# Patient Record
Sex: FEMALE | NOT HISPANIC OR LATINO
[De-identification: names, ages, dates, MRNs, and addresses within clinical notes are randomized per-mention and may not be internally consistent; named-entity substitution may affect disease eponyms.]

---

## 2017-01-13 ENCOUNTER — APPOINTMENT (OUTPATIENT)
Dept: OPHTHALMOLOGY | Facility: CLINIC | Age: 72
End: 2017-01-13

## 2017-01-13 DIAGNOSIS — H52.4 PRESBYOPIA: ICD-10-CM

## 2017-03-31 ENCOUNTER — APPOINTMENT (OUTPATIENT)
Dept: OPHTHALMOLOGY | Facility: CLINIC | Age: 72
End: 2017-03-31

## 2017-03-31 DIAGNOSIS — H26.9 UNSPECIFIED CATARACT: ICD-10-CM

## 2017-05-24 ENCOUNTER — APPOINTMENT (OUTPATIENT)
Dept: OPHTHALMOLOGY | Facility: CLINIC | Age: 72
End: 2017-05-24

## 2018-01-31 ENCOUNTER — APPOINTMENT (OUTPATIENT)
Dept: OPHTHALMOLOGY | Facility: CLINIC | Age: 73
End: 2018-01-31
Payer: MEDICARE

## 2018-01-31 PROCEDURE — 92134 CPTRZ OPH DX IMG PST SGM RTA: CPT

## 2018-01-31 PROCEDURE — 92014 COMPRE OPH EXAM EST PT 1/>: CPT

## 2018-03-27 ENCOUNTER — APPOINTMENT (OUTPATIENT)
Dept: OPHTHALMOLOGY | Facility: CLINIC | Age: 73
End: 2018-03-27
Payer: MEDICARE

## 2018-03-27 DIAGNOSIS — H26.491 OTHER SECONDARY CATARACT, RIGHT EYE: ICD-10-CM

## 2018-03-27 PROCEDURE — 66821 AFTER CATARACT LASER SURGERY: CPT | Mod: RT

## 2018-04-24 ENCOUNTER — APPOINTMENT (OUTPATIENT)
Dept: OPHTHALMOLOGY | Facility: CLINIC | Age: 73
End: 2018-04-24
Payer: MEDICARE

## 2018-04-24 DIAGNOSIS — H26.9 UNSPECIFIED CATARACT: ICD-10-CM

## 2018-04-24 PROCEDURE — 92012 INTRM OPH EXAM EST PATIENT: CPT | Mod: 24

## 2018-07-27 PROBLEM — H52.4 BILATERAL PRESBYOPIA: Status: ACTIVE | Noted: 2017-01-18

## 2018-11-14 ENCOUNTER — APPOINTMENT (OUTPATIENT)
Dept: OPHTHALMOLOGY | Facility: CLINIC | Age: 73
End: 2018-11-14
Payer: MEDICARE

## 2018-11-14 PROCEDURE — 92133 CPTRZD OPH DX IMG PST SGM ON: CPT

## 2018-11-14 PROCEDURE — 92083 EXTENDED VISUAL FIELD XM: CPT

## 2018-11-14 PROCEDURE — 92014 COMPRE OPH EXAM EST PT 1/>: CPT

## 2019-03-04 ENCOUNTER — RECORD ABSTRACTING (OUTPATIENT)
Age: 74
End: 2019-03-04

## 2019-03-04 DIAGNOSIS — Z82.49 FAMILY HISTORY OF ISCHEMIC HEART DISEASE AND OTHER DISEASES OF THE CIRCULATORY SYSTEM: ICD-10-CM

## 2019-03-04 DIAGNOSIS — M72.0 PALMAR FASCIAL FIBROMATOSIS [DUPUYTREN]: ICD-10-CM

## 2019-03-04 DIAGNOSIS — Z80.3 FAMILY HISTORY OF MALIGNANT NEOPLASM OF BREAST: ICD-10-CM

## 2019-03-04 DIAGNOSIS — R31.29 OTHER MICROSCOPIC HEMATURIA: ICD-10-CM

## 2019-03-04 RX ORDER — CALCIUM CARBONATE 500(1250)
500 TABLET ORAL
Refills: 0 | Status: ACTIVE | COMMUNITY

## 2019-03-04 RX ORDER — UBIDECARENONE/VIT E ACET 100MG-5
50 MCG CAPSULE ORAL
Refills: 0 | Status: ACTIVE | COMMUNITY

## 2019-03-07 ENCOUNTER — APPOINTMENT (OUTPATIENT)
Dept: FAMILY MEDICINE | Facility: CLINIC | Age: 74
End: 2019-03-07
Payer: MEDICARE

## 2019-03-07 ENCOUNTER — NON-APPOINTMENT (OUTPATIENT)
Age: 74
End: 2019-03-07

## 2019-03-07 VITALS
BODY MASS INDEX: 23.73 KG/M2 | HEIGHT: 64 IN | OXYGEN SATURATION: 97 % | RESPIRATION RATE: 18 BRPM | WEIGHT: 139 LBS | HEART RATE: 70 BPM | SYSTOLIC BLOOD PRESSURE: 130 MMHG | DIASTOLIC BLOOD PRESSURE: 80 MMHG | TEMPERATURE: 97.7 F

## 2019-03-07 DIAGNOSIS — R06.09 OTHER FORMS OF DYSPNEA: ICD-10-CM

## 2019-03-07 DIAGNOSIS — L70.9 ACNE, UNSPECIFIED: ICD-10-CM

## 2019-03-07 DIAGNOSIS — H90.3 SENSORINEURAL HEARING LOSS, BILATERAL: ICD-10-CM

## 2019-03-07 DIAGNOSIS — H54.7 UNSPECIFIED VISUAL LOSS: ICD-10-CM

## 2019-03-07 PROCEDURE — 99215 OFFICE O/P EST HI 40 MIN: CPT | Mod: 25

## 2019-03-07 PROCEDURE — 93000 ELECTROCARDIOGRAM COMPLETE: CPT

## 2019-03-07 PROCEDURE — 36415 COLL VENOUS BLD VENIPUNCTURE: CPT

## 2019-03-07 PROCEDURE — 94010 BREATHING CAPACITY TEST: CPT

## 2019-03-07 PROCEDURE — 82270 OCCULT BLOOD FECES: CPT

## 2019-03-07 PROCEDURE — 99173 VISUAL ACUITY SCREEN: CPT | Mod: 59,GY

## 2019-03-07 PROCEDURE — 92552 PURE TONE AUDIOMETRY AIR: CPT

## 2019-03-09 LAB
25(OH)D3 SERPL-MCNC: 77.9 NG/ML
ALBUMIN SERPL ELPH-MCNC: 4.4 G/DL
ALP BLD-CCNC: 101 U/L
ALT SERPL-CCNC: 23 U/L
ANION GAP SERPL CALC-SCNC: 16 MMOL/L
APPEARANCE: CLEAR
AST SERPL-CCNC: 22 U/L
BACTERIA: NEGATIVE
BASOPHILS # BLD AUTO: 0.09 K/UL
BASOPHILS NFR BLD AUTO: 1.7 %
BILIRUB SERPL-MCNC: 0.3 MG/DL
BILIRUBIN URINE: NEGATIVE
BLOOD URINE: ABNORMAL
BUN SERPL-MCNC: 21 MG/DL
CALCIUM SERPL-MCNC: 9.6 MG/DL
CHLORIDE SERPL-SCNC: 106 MMOL/L
CHOLEST SERPL-MCNC: 220 MG/DL
CHOLEST/HDLC SERPL: 3.5 RATIO
CO2 SERPL-SCNC: 23 MMOL/L
COLOR: YELLOW
CREAT SERPL-MCNC: 0.88 MG/DL
EOSINOPHIL # BLD AUTO: 0.18 K/UL
EOSINOPHIL NFR BLD AUTO: 3.4 %
FOLATE SERPL-MCNC: >20 NG/ML
GLUCOSE QUALITATIVE U: NEGATIVE
GLUCOSE SERPL-MCNC: 89 MG/DL
HBA1C MFR BLD HPLC: 5.9 %
HCT VFR BLD CALC: 43.8 %
HDLC SERPL-MCNC: 63 MG/DL
HGB BLD-MCNC: 13.9 G/DL
HYALINE CASTS: 3 /LPF
IMM GRANULOCYTES NFR BLD AUTO: 0.4 %
KETONES URINE: NEGATIVE
LDLC SERPL CALC-MCNC: 141 MG/DL
LEUKOCYTE ESTERASE URINE: ABNORMAL
LYMPHOCYTES # BLD AUTO: 1.48 K/UL
LYMPHOCYTES NFR BLD AUTO: 28.1 %
MAN DIFF?: NORMAL
MCHC RBC-ENTMCNC: 29 PG
MCHC RBC-ENTMCNC: 31.7 GM/DL
MCV RBC AUTO: 91.3 FL
MICROSCOPIC-UA: NORMAL
MONOCYTES # BLD AUTO: 0.48 K/UL
MONOCYTES NFR BLD AUTO: 9.1 %
NEUTROPHILS # BLD AUTO: 3.01 K/UL
NEUTROPHILS NFR BLD AUTO: 57.3 %
NITRITE URINE: NEGATIVE
PH URINE: 5
PLATELET # BLD AUTO: 180 K/UL
POTASSIUM SERPL-SCNC: 5 MMOL/L
PROT SERPL-MCNC: 6.9 G/DL
PROTEIN URINE: NEGATIVE
RBC # BLD: 4.8 M/UL
RBC # FLD: 13.9 %
RED BLOOD CELLS URINE: 4 /HPF
SODIUM SERPL-SCNC: 145 MMOL/L
SPECIFIC GRAVITY URINE: 1.02
SQUAMOUS EPITHELIAL CELLS: 4 /HPF
TRIGL SERPL-MCNC: 79 MG/DL
TSH SERPL-ACNC: 3.61 UIU/ML
UROBILINOGEN URINE: NORMAL
VIT B12 SERPL-MCNC: 478 PG/ML
WBC # FLD AUTO: 5.26 K/UL
WHITE BLOOD CELLS URINE: 3 /HPF

## 2019-03-10 ENCOUNTER — NON-APPOINTMENT (OUTPATIENT)
Age: 74
End: 2019-03-10

## 2019-03-11 PROBLEM — R06.09 DYSPNEA ON EXERTION: Status: ACTIVE | Noted: 2019-03-07

## 2019-03-11 PROBLEM — H54.7 DECREASED VISUAL ACUITY: Status: ACTIVE | Noted: 2019-03-07

## 2019-03-11 PROBLEM — H90.3 SENSORINEURAL HEARING LOSS (SNHL) OF BOTH EARS: Status: ACTIVE | Noted: 2019-03-04

## 2019-03-11 NOTE — PHYSICAL EXAM
[Normal] : Visual acuity was normal [20/___] : left eye 20/[unfilled] [No Acute Distress] : no acute distress [Well Nourished] : well nourished [Well Developed] : well developed [Well-Appearing] : well-appearing [Normal Sclera/Conjunctiva] : normal sclera/conjunctiva [PERRL] : pupils equal round and reactive to light [EOMI] : extraocular movements intact [Normal Outer Ear/Nose] : the outer ears and nose were normal in appearance [Normal Oropharynx] : the oropharynx was normal [No JVD] : no jugular venous distention [Supple] : supple [No Lymphadenopathy] : no lymphadenopathy [Thyroid Normal, No Nodules] : the thyroid was normal and there were no nodules present [No Respiratory Distress] : no respiratory distress  [Clear to Auscultation] : lungs were clear to auscultation bilaterally [No Accessory Muscle Use] : no accessory muscle use [Normal Rate] : normal rate  [Regular Rhythm] : with a regular rhythm [Normal S1, S2] : normal S1 and S2 [No Murmur] : no murmur heard [No Carotid Bruits] : no carotid bruits [No Abdominal Bruit] : a ~M bruit was not heard ~T in the abdomen [No Varicosities] : no varicosities [Pedal Pulses Present] : the pedal pulses are present [No Edema] : there was no peripheral edema [No Extremity Clubbing/Cyanosis] : no extremity clubbing/cyanosis [No Palpable Aorta] : no palpable aorta [Soft] : abdomen soft [Non Tender] : non-tender [Non-distended] : non-distended [No HSM] : no HSM [Normal Bowel Sounds] : normal bowel sounds [Normal Posterior Cervical Nodes] : no posterior cervical lymphadenopathy [Normal Anterior Cervical Nodes] : no anterior cervical lymphadenopathy [No CVA Tenderness] : no CVA  tenderness [No Spinal Tenderness] : no spinal tenderness [No Joint Swelling] : no joint swelling [Grossly Normal Strength/Tone] : grossly normal strength/tone [No Rash] : no rash [Normal Gait] : normal gait [Coordination Grossly Intact] : coordination grossly intact [No Focal Deficits] : no focal deficits [Deep Tendon Reflexes (DTR)] : deep tendon reflexes were 2+ and symmetric [Normal Affect] : the affect was normal [Normal Insight/Judgement] : insight and judgment were intact [Normal Voice/Communication] : normal voice/communication [Fundoscopic Exam Performed] : fundoscopic ~T exam ~C was performed [Normal TMs] : both tympanic membranes were normal [Normal Nasal Mucosa] : the nasal mucosa was normal [Normal Percussion] : the chest was normal to percussion [Normal Appearance] : normal in appearance [No Masses] : no palpable masses [No Nipple Discharge] : no nipple discharge [No Axillary Lymphadenopathy] : no axillary lymphadenopathy [No Stool to Guaiac] : no stool to guaiac [Normal Sphincter Tone] : normal sphincter tone [No Mass] : no mass [External Female Genitalia] : normal external genitalia [Normal Supraclavicular Nodes] : no supraclavicular lymphadenopathy [Normal Axillary Nodes] : no axillary lymphadenopathy [Normal Femoral Nodes] : no femoral lymphadenopathy [Normal Inguinal Nodes] : no inguinal lymphadenopathy [No Skin Lesions] : no skin lesions [Kyphosis] : no kyphosis [Scoliosis] : no scoliosis [Acne] : no acne [FreeTextEntry1] : Examination deferred to GYN.

## 2019-03-11 NOTE — HISTORY OF PRESENT ILLNESS
[FreeTextEntry1] : Annual Physical Exam  [de-identified] : Patient presents for Complete Physical Examination. Feels well in general. No new symptoms. Taking glaucoma prescription medications only. Nonsmoker. Social alcohol. Immunizations are up-to-date. All preventative services were reviewed in detail and appear to be current for age.

## 2019-03-16 LAB
DATE COLLECTED: NORMAL
HEMOCCULT SP1 STL QL: NEGATIVE

## 2019-03-20 ENCOUNTER — APPOINTMENT (OUTPATIENT)
Dept: OPHTHALMOLOGY | Facility: CLINIC | Age: 74
End: 2019-03-20
Payer: MEDICARE

## 2019-03-20 DIAGNOSIS — H35.373 PUCKERING OF MACULA, BILATERAL: ICD-10-CM

## 2019-03-20 DIAGNOSIS — Z96.1 PRESENCE OF INTRAOCULAR LENS: ICD-10-CM

## 2019-03-20 DIAGNOSIS — H25.12 AGE-RELATED NUCLEAR CATARACT, LEFT EYE: ICD-10-CM

## 2019-03-20 DIAGNOSIS — H43.393 OTHER VITREOUS OPACITIES, BILATERAL: ICD-10-CM

## 2019-03-20 PROCEDURE — 76514 ECHO EXAM OF EYE THICKNESS: CPT

## 2019-03-20 PROCEDURE — 92250 FUNDUS PHOTOGRAPHY W/I&R: CPT

## 2019-03-20 PROCEDURE — 92014 COMPRE OPH EXAM EST PT 1/>: CPT

## 2019-03-29 RX ORDER — LATANOPROST/PF 0.005 %
0.01 DROPS OPHTHALMIC (EYE)
Qty: 3 | Refills: 3 | Status: ACTIVE | COMMUNITY
Start: 2017-03-31 | End: 1900-01-01

## 2019-10-15 ENCOUNTER — APPOINTMENT (OUTPATIENT)
Dept: FAMILY MEDICINE | Facility: CLINIC | Age: 74
End: 2019-10-15
Payer: MEDICARE

## 2019-10-15 ENCOUNTER — NON-APPOINTMENT (OUTPATIENT)
Age: 74
End: 2019-10-15

## 2019-10-15 VITALS
WEIGHT: 139 LBS | SYSTOLIC BLOOD PRESSURE: 158 MMHG | RESPIRATION RATE: 18 BRPM | HEART RATE: 95 BPM | HEIGHT: 64 IN | BODY MASS INDEX: 23.73 KG/M2 | OXYGEN SATURATION: 97 % | DIASTOLIC BLOOD PRESSURE: 76 MMHG

## 2019-10-15 PROCEDURE — 93000 ELECTROCARDIOGRAM COMPLETE: CPT

## 2019-10-15 PROCEDURE — 99214 OFFICE O/P EST MOD 30 MIN: CPT | Mod: 25

## 2019-10-15 PROCEDURE — 36415 COLL VENOUS BLD VENIPUNCTURE: CPT

## 2019-10-15 NOTE — HISTORY OF PRESENT ILLNESS
[FreeTextEntry8] : Ms. TIKA KOTHARI is a 74 year old female here today stating that for the past 2 months at times she has " been aware of her heartbeat". Says this usually occurs in the evening. Not associated with pain or palpitations.\par Drinks one cup of coffee a day on average.\par Also presents with 2 -3 day hx of cough. Hx of chronic sinus problems related to allergies.\par

## 2019-10-15 NOTE — ASSESSMENT
[FreeTextEntry1] : - patient's symptoms are non-specific. Her awareness of her heartbeat is not connected with palpitations. Has had thyroid testing in the past that was negative. No other obvious symptoms of hyperthyroidism.\par - Patient's cough is likely due to postnasal drip related to allergies.\par - Patient's fatigue is also very non-specific.

## 2019-10-15 NOTE — PHYSICAL EXAM
[Normal Oropharynx] : the oropharynx was normal [Thyroid Normal, No Nodules] : the thyroid was normal and there were no nodules present [No Lymphadenopathy] : no lymphadenopathy [Normal S1, S2] : normal S1 and S2 [No Murmur] : no murmur heard [Clear to Auscultation] : lungs were clear to auscultation bilaterally [Normal] : affect was normal and insight and judgment were intact [No Edema] : there was no peripheral edema [de-identified] : Regular with some variability with breathing.

## 2019-10-15 NOTE — REVIEW OF SYSTEMS
[Fatigue] : fatigue [Hot Flashes] : hot flashes [Recent Change In Weight] : ~T no recent weight change [Postnasal Drip] : postnasal drip [Chest Pain] : no chest pain [Palpitations] : no palpitations [Cough] : cough [Dizziness] : no dizziness [Negative] : Psychiatric [FreeTextEntry5] : Aware of her hearbeat

## 2019-10-17 LAB
ALBUMIN SERPL ELPH-MCNC: 4.3 G/DL
ALP BLD-CCNC: 94 U/L
ALT SERPL-CCNC: 17 U/L
ANION GAP SERPL CALC-SCNC: 14 MMOL/L
AST SERPL-CCNC: 19 U/L
BASOPHILS # BLD AUTO: 0.08 K/UL
BASOPHILS NFR BLD AUTO: 1.3 %
BILIRUB SERPL-MCNC: 0.3 MG/DL
BUN SERPL-MCNC: 21 MG/DL
CALCIUM SERPL-MCNC: 10.4 MG/DL
CHLORIDE SERPL-SCNC: 102 MMOL/L
CO2 SERPL-SCNC: 29 MMOL/L
CREAT SERPL-MCNC: 0.88 MG/DL
EOSINOPHIL # BLD AUTO: 0.21 K/UL
EOSINOPHIL NFR BLD AUTO: 3.4 %
GLUCOSE SERPL-MCNC: 128 MG/DL
HCT VFR BLD CALC: 45.6 %
HGB BLD-MCNC: 14.3 G/DL
IMM GRANULOCYTES NFR BLD AUTO: 0.2 %
LYMPHOCYTES # BLD AUTO: 1.63 K/UL
LYMPHOCYTES NFR BLD AUTO: 26.7 %
MAN DIFF?: NORMAL
MCHC RBC-ENTMCNC: 29.4 PG
MCHC RBC-ENTMCNC: 31.4 GM/DL
MCV RBC AUTO: 93.6 FL
MONOCYTES # BLD AUTO: 0.47 K/UL
MONOCYTES NFR BLD AUTO: 7.7 %
NEUTROPHILS # BLD AUTO: 3.7 K/UL
NEUTROPHILS NFR BLD AUTO: 60.7 %
PLATELET # BLD AUTO: 174 K/UL
POTASSIUM SERPL-SCNC: 4.7 MMOL/L
PROT SERPL-MCNC: 6.7 G/DL
RBC # BLD: 4.87 M/UL
RBC # FLD: 14.1 %
SODIUM SERPL-SCNC: 145 MMOL/L
T3 SERPL-MCNC: 104 NG/DL
T4 SERPL-MCNC: 5.8 UG/DL
TSH SERPL-ACNC: 3.06 UIU/ML
WBC # FLD AUTO: 6.1 K/UL

## 2019-10-23 ENCOUNTER — APPOINTMENT (OUTPATIENT)
Dept: OPHTHALMOLOGY | Facility: CLINIC | Age: 74
End: 2019-10-23
Payer: MEDICARE

## 2019-10-23 ENCOUNTER — NON-APPOINTMENT (OUTPATIENT)
Age: 74
End: 2019-10-23

## 2019-10-23 PROCEDURE — 92083 EXTENDED VISUAL FIELD XM: CPT

## 2019-10-23 PROCEDURE — 92014 COMPRE OPH EXAM EST PT 1/>: CPT

## 2019-10-23 PROCEDURE — 92133 CPTRZD OPH DX IMG PST SGM ON: CPT

## 2019-11-20 ENCOUNTER — APPOINTMENT (OUTPATIENT)
Dept: CARDIOLOGY | Facility: CLINIC | Age: 74
End: 2019-11-20
Payer: MEDICARE

## 2019-11-20 VITALS
HEART RATE: 91 BPM | DIASTOLIC BLOOD PRESSURE: 80 MMHG | WEIGHT: 136 LBS | BODY MASS INDEX: 21.86 KG/M2 | SYSTOLIC BLOOD PRESSURE: 124 MMHG | HEIGHT: 66 IN

## 2019-11-20 PROCEDURE — 93225 XTRNL ECG REC<48 HRS REC: CPT

## 2019-11-20 PROCEDURE — 99204 OFFICE O/P NEW MOD 45 MIN: CPT | Mod: 25

## 2019-12-02 ENCOUNTER — RESULT CHARGE (OUTPATIENT)
Age: 74
End: 2019-12-02

## 2019-12-02 PROCEDURE — 93227 XTRNL ECG REC<48 HR R&I: CPT

## 2019-12-04 ENCOUNTER — RESULT REVIEW (OUTPATIENT)
Age: 74
End: 2019-12-04

## 2019-12-04 ENCOUNTER — APPOINTMENT (OUTPATIENT)
Dept: CARDIOLOGY | Facility: CLINIC | Age: 74
End: 2019-12-04
Payer: MEDICARE

## 2019-12-04 VITALS
SYSTOLIC BLOOD PRESSURE: 118 MMHG | BODY MASS INDEX: 21.86 KG/M2 | HEART RATE: 78 BPM | HEIGHT: 66 IN | DIASTOLIC BLOOD PRESSURE: 70 MMHG | WEIGHT: 136 LBS

## 2019-12-04 PROCEDURE — 99213 OFFICE O/P EST LOW 20 MIN: CPT

## 2019-12-16 ENCOUNTER — APPOINTMENT (OUTPATIENT)
Dept: FAMILY MEDICINE | Facility: CLINIC | Age: 74
End: 2019-12-16
Payer: MEDICARE

## 2019-12-16 VITALS
BODY MASS INDEX: 21.86 KG/M2 | WEIGHT: 136 LBS | OXYGEN SATURATION: 97 % | RESPIRATION RATE: 17 BRPM | TEMPERATURE: 97.2 F | HEIGHT: 66 IN | DIASTOLIC BLOOD PRESSURE: 82 MMHG | HEART RATE: 73 BPM | SYSTOLIC BLOOD PRESSURE: 130 MMHG

## 2019-12-16 DIAGNOSIS — R00.2 PALPITATIONS: ICD-10-CM

## 2019-12-16 PROCEDURE — 99213 OFFICE O/P EST LOW 20 MIN: CPT

## 2019-12-26 PROBLEM — R00.2 PALPITATION: Status: ACTIVE | Noted: 2019-03-07

## 2020-03-06 ENCOUNTER — APPOINTMENT (OUTPATIENT)
Dept: FAMILY MEDICINE | Facility: CLINIC | Age: 75
End: 2020-03-06
Payer: MEDICARE

## 2020-03-06 ENCOUNTER — NON-APPOINTMENT (OUTPATIENT)
Age: 75
End: 2020-03-06

## 2020-03-06 VITALS
RESPIRATION RATE: 17 BRPM | DIASTOLIC BLOOD PRESSURE: 90 MMHG | TEMPERATURE: 98.1 F | HEIGHT: 66 IN | HEART RATE: 80 BPM | OXYGEN SATURATION: 98 % | BODY MASS INDEX: 22.98 KG/M2 | SYSTOLIC BLOOD PRESSURE: 180 MMHG | WEIGHT: 143 LBS

## 2020-03-06 DIAGNOSIS — Z00.00 ENCOUNTER FOR GENERAL ADULT MEDICAL EXAMINATION W/OUT ABNORMAL FINDINGS: ICD-10-CM

## 2020-03-06 PROCEDURE — 99215 OFFICE O/P EST HI 40 MIN: CPT | Mod: 25

## 2020-03-06 PROCEDURE — 93000 ELECTROCARDIOGRAM COMPLETE: CPT

## 2020-03-06 PROCEDURE — 36415 COLL VENOUS BLD VENIPUNCTURE: CPT

## 2020-03-07 LAB
ALBUMIN SERPL ELPH-MCNC: 4.6 G/DL
ALP BLD-CCNC: 114 U/L
ALT SERPL-CCNC: 19 U/L
ANION GAP SERPL CALC-SCNC: 12 MMOL/L
APTT BLD: 28.6 SEC
AST SERPL-CCNC: 22 U/L
BASOPHILS # BLD AUTO: 0.07 K/UL
BASOPHILS NFR BLD AUTO: 1.3 %
BILIRUB SERPL-MCNC: 0.4 MG/DL
BUN SERPL-MCNC: 20 MG/DL
CALCIUM SERPL-MCNC: 10.7 MG/DL
CHLORIDE SERPL-SCNC: 102 MMOL/L
CO2 SERPL-SCNC: 29 MMOL/L
CREAT SERPL-MCNC: 0.86 MG/DL
EOSINOPHIL # BLD AUTO: 0.2 K/UL
EOSINOPHIL NFR BLD AUTO: 3.6 %
GLUCOSE SERPL-MCNC: 78 MG/DL
HCT VFR BLD CALC: 45.3 %
HGB BLD-MCNC: 14.5 G/DL
IMM GRANULOCYTES NFR BLD AUTO: 0.4 %
INR PPP: 0.94 RATIO
LYMPHOCYTES # BLD AUTO: 1.63 K/UL
LYMPHOCYTES NFR BLD AUTO: 29.3 %
MAN DIFF?: NORMAL
MCHC RBC-ENTMCNC: 29.1 PG
MCHC RBC-ENTMCNC: 32 GM/DL
MCV RBC AUTO: 90.8 FL
MONOCYTES # BLD AUTO: 0.67 K/UL
MONOCYTES NFR BLD AUTO: 12.1 %
NEUTROPHILS # BLD AUTO: 2.97 K/UL
NEUTROPHILS NFR BLD AUTO: 53.3 %
PLATELET # BLD AUTO: 189 K/UL
POTASSIUM SERPL-SCNC: 4.6 MMOL/L
PROT SERPL-MCNC: 7.1 G/DL
PT BLD: 10.6 SEC
RBC # BLD: 4.99 M/UL
RBC # FLD: 14.1 %
SODIUM SERPL-SCNC: 143 MMOL/L
WBC # FLD AUTO: 5.56 K/UL

## 2020-03-11 ENCOUNTER — NON-APPOINTMENT (OUTPATIENT)
Age: 75
End: 2020-03-11

## 2020-03-12 NOTE — ASSESSMENT
[High Risk Surgery - Intraperitoneal, Intrathoracic or Supringuinal Vascular Procedures] : High Risk Surgery - Intraperitoneal, Intrathoracic or Supringuinal Vascular Procedures - No (0) [Ischemic Heart Disease] : Ischemic Heart Disease - No (0) [Congestive Heart Failure] : Congestive Heart Failure - No (0) [Prior Cerebrovascular Accident or TIA] : Prior Cerebrovascular Accident or TIA - No (0) [Creatinine >= 2mg/dL (1 Point)] : Creatinine >= 2mg/dL - No (0) [Insulin-dependent Diabetic (1 Point)] : Insulin-dependent Diabetic - No (0) [0] : 0 , RCRI Class: I, Risk of Post-Op Cardiac Complications: 3.9%, 95% CI for Risk Estimate: 2.8% - 5.4% [Patient Optimized for Surgery] : Patient optimized for surgery [No Further Testing Recommended] : no further testing recommended [As per surgery] : as per surgery [FreeTextEntry4] : Patient was examined, and all pre operative testing-EKG and laboratory-were reviewed in detail. Patient is medically cleared for the proposed surgical procedure with acceptable medical risk.\par \par

## 2020-03-12 NOTE — PHYSICAL EXAM
[No Acute Distress] : no acute distress [Well Nourished] : well nourished [Well Developed] : well developed [Well-Appearing] : well-appearing [Normal Voice/Communication] : normal voice/communication [Normal Sclera/Conjunctiva] : normal sclera/conjunctiva [PERRL] : pupils equal round and reactive to light [EOMI] : extraocular movements intact [Normal Outer Ear/Nose] : the outer ears and nose were normal in appearance [Normal Oropharynx] : the oropharynx was normal [No JVD] : no jugular venous distention [No Lymphadenopathy] : no lymphadenopathy [Supple] : supple [Thyroid Normal, No Nodules] : the thyroid was normal and there were no nodules present [No Respiratory Distress] : no respiratory distress  [No Accessory Muscle Use] : no accessory muscle use [Clear to Auscultation] : lungs were clear to auscultation bilaterally [Normal Rate] : normal rate  [Regular Rhythm] : with a regular rhythm [Normal S1, S2] : normal S1 and S2 [No Murmur] : no murmur heard [No Carotid Bruits] : no carotid bruits [No Abdominal Bruit] : a ~M bruit was not heard ~T in the abdomen [No Varicosities] : no varicosities [Pedal Pulses Present] : the pedal pulses are present [No Edema] : there was no peripheral edema [No Palpable Aorta] : no palpable aorta [No Extremity Clubbing/Cyanosis] : no extremity clubbing/cyanosis [Soft] : abdomen soft [Non Tender] : non-tender [Non-distended] : non-distended [No Masses] : no abdominal mass palpated [No HSM] : no HSM [Normal Bowel Sounds] : normal bowel sounds [Normal Posterior Cervical Nodes] : no posterior cervical lymphadenopathy [Normal Anterior Cervical Nodes] : no anterior cervical lymphadenopathy [No CVA Tenderness] : no CVA  tenderness [No Spinal Tenderness] : no spinal tenderness [No Joint Swelling] : no joint swelling [Grossly Normal Strength/Tone] : grossly normal strength/tone [No Rash] : no rash [Coordination Grossly Intact] : coordination grossly intact [No Focal Deficits] : no focal deficits [Normal Gait] : normal gait [Speech Grossly Normal] : speech grossly normal [Normal Affect] : the affect was normal [Alert and Oriented x3] : oriented to person, place, and time [Normal Insight/Judgement] : insight and judgment were intact [Fundoscopic Exam Performed] : fundoscopic ~T exam ~C was performed [Normal TMs] : both tympanic membranes were normal [Normal Nasal Mucosa] : the nasal mucosa was normal [Normal Supraclavicular Nodes] : no supraclavicular lymphadenopathy [Normal Axillary Nodes] : no axillary lymphadenopathy [Normal Inguinal Nodes] : no inguinal lymphadenopathy [Normal Femoral Nodes] : no femoral lymphadenopathy [No Skin Lesions] : no skin lesions [Deep Tendon Reflexes (DTR)] : deep tendon reflexes were 2+ and symmetric [Normal Mood] : the mood was normal [Normal Percussion] : the chest was normal to percussion [Kyphosis] : no kyphosis [Scoliosis] : no scoliosis [de-identified] : Deferred [FreeTextEntry1] : Deferred

## 2020-03-12 NOTE — RESULTS/DATA
[] : results reviewed [de-identified] : Normal [de-identified] : Normal [de-identified] : Normal

## 2020-03-12 NOTE — HISTORY OF PRESENT ILLNESS
[No Pertinent Cardiac History] : no history of aortic stenosis, atrial fibrillation, coronary artery disease, recent myocardial infarction, or implantable device/pacemaker [No Pertinent Pulmonary History] : no history of asthma, COPD, sleep apnea, or smoking [No Adverse Anesthesia Reaction] : no adverse anesthesia reaction in self or family member [(Patient denies any chest pain, claudication, dyspnea on exertion, orthopnea, palpitations or syncope)] : Patient denies any chest pain, claudication, dyspnea on exertion, orthopnea, palpitations or syncope [Moderate (4-6 METs)] : Moderate (4-6 METs) [Aortic Stenosis] : no aortic stenosis [Atrial Fibrillation] : no atrial fibrillation [Coronary Artery Disease] : no coronary artery disease [Recent Myocardial Infarction] : no recent myocardial infarction [Implantable Device/Pacemaker] : no implantable device/pacemaker [Asthma] : no asthma [COPD] : no COPD [Sleep Apnea] : no sleep apnea [Smoker] : not a smoker [Family Member] : no family member with adverse anesthesia reaction/sudden death [Self] : no previous adverse anesthesia reaction [Chronic Anticoagulation] : no chronic anticoagulation [Chronic Kidney Disease] : no chronic kidney disease [Diabetes] : no diabetes [FreeTextEntry1] : Left breast lumpectomy [FreeTextEntry2] : 03/16/20 [FreeTextEntry3] : Rayna Louis MD [FreeTextEntry4] : Patient presents for preoperative medical clearance-left breast lumpectomy-[facility]-03/16/20-Rayna Louis MD. Had left breast mammography/ultrasound-02/18/20-for evaluation of palpable left breast mass. Results were consistent with malignancy. 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 Schedule for the above procedure. [FreeTextEntry7] : Holter monitor-11/20/19-(+)APC's\par Transthoracic echocardiogram-12/04/20-normal LV function with ejection fraction 65-70%, grade I/IV diastolic dysfunction, mild pulmonary valve regurgitation

## 2020-03-12 NOTE — REVIEW OF SYSTEMS
[Negative] : Heme/Lymph [Fever] : no fever [Chills] : no chills [Fatigue] : no fatigue [Hot Flashes] : no hot flashes [Night Sweats] : no night sweats [Recent Change In Weight] : ~T no recent weight change [Discharge] : no discharge [Pain] : no pain [Redness] : no redness [Dryness] : no dryness  [Vision Problems] : no vision problems [Itching] : no itching [Earache] : no earache [Hearing Loss] : no hearing loss [Nosebleed] : no nosebleeds [Hoarseness] : no hoarseness [Nasal Discharge] : no nasal discharge [Postnasal Drip] : no postnasal drip [Chest Pain] : no chest pain [Palpitations] : no palpitations [Leg Claudication] : no leg claudication [Lower Ext Edema] : no lower extremity edema [Orthopnea] : no orthopnea [Paroxysmal Nocturnal Dyspnea] : no paroxysmal nocturnal dyspnea [Shortness Of Breath] : no shortness of breath [Wheezing] : no wheezing [Cough] : no cough [Dyspnea on Exertion] : no dyspnea on exertion [Abdominal Pain] : no abdominal pain [Nausea] : no nausea [Constipation] : no constipation [Diarrhea] : diarrhea [Vomiting] : no vomiting [Heartburn] : no heartburn [Melena] : no melena [Dysuria] : no dysuria [Incontinence] : no incontinence [Nocturia] : no nocturia [Hematuria] : no hematuria [Frequency] : no frequency [Vaginal Discharge] : no vaginal discharge [Joint Pain] : no joint pain [Joint Stiffness] : no joint stiffness [Joint Swelling] : no joint swelling [Muscle Weakness] : no muscle weakness [Muscle Pain] : no muscle pain [Back Pain] : no back pain [Mole Changes] : no mole changes [Nail Changes] : no nail changes [Hair Changes] : no hair changes [Skin Rash] : no skin rash [Headache] : no headache [Dizziness] : no dizziness [Fainting] : no fainting [Confusion] : no confusion [Memory Loss] : no memory loss [Unsteady Walking] : no ataxia [Suicidal] : not suicidal [Insomnia] : no insomnia [Anxiety] : no anxiety [Depression] : no depression [Easy Bleeding] : no easy bleeding [Easy Bruising] : no easy bruising [Swollen Glands] : no swollen glands

## 2020-03-25 ENCOUNTER — APPOINTMENT (OUTPATIENT)
Dept: OPHTHALMOLOGY | Facility: CLINIC | Age: 75
End: 2020-03-25

## 2020-04-27 ENCOUNTER — TRANSCRIPTION ENCOUNTER (OUTPATIENT)
Age: 75
End: 2020-04-27

## 2020-04-27 ENCOUNTER — APPOINTMENT (OUTPATIENT)
Dept: FAMILY MEDICINE | Facility: CLINIC | Age: 75
End: 2020-04-27
Payer: MEDICARE

## 2020-04-27 PROCEDURE — 99213 OFFICE O/P EST LOW 20 MIN: CPT | Mod: 95

## 2020-05-14 ENCOUNTER — APPOINTMENT (OUTPATIENT)
Dept: FAMILY MEDICINE | Facility: CLINIC | Age: 75
End: 2020-05-14
Payer: MEDICARE

## 2020-05-14 VITALS
OXYGEN SATURATION: 95 % | SYSTOLIC BLOOD PRESSURE: 130 MMHG | BODY MASS INDEX: 21.53 KG/M2 | WEIGHT: 134 LBS | TEMPERATURE: 98.5 F | DIASTOLIC BLOOD PRESSURE: 72 MMHG | HEART RATE: 85 BPM | HEIGHT: 66 IN

## 2020-05-14 VITALS
TEMPERATURE: 98.5 F | HEIGHT: 66 IN | WEIGHT: 134 LBS | OXYGEN SATURATION: 95 % | HEART RATE: 85 BPM | DIASTOLIC BLOOD PRESSURE: 72 MMHG | BODY MASS INDEX: 21.53 KG/M2 | RESPIRATION RATE: 17 BRPM | SYSTOLIC BLOOD PRESSURE: 130 MMHG

## 2020-05-14 DIAGNOSIS — N30.00 ACUTE CYSTITIS W/OUT HEMATURIA: ICD-10-CM

## 2020-05-14 DIAGNOSIS — Z86.79 PERSONAL HISTORY OF OTHER DISEASES OF THE CIRCULATORY SYSTEM: ICD-10-CM

## 2020-05-14 DIAGNOSIS — J01.41 ACUTE RECURRENT PANSINUSITIS: ICD-10-CM

## 2020-05-14 DIAGNOSIS — C50.912 MALIGNANT NEOPLASM OF UNSPECIFIED SITE OF LEFT FEMALE BREAST: ICD-10-CM

## 2020-05-14 PROCEDURE — 36415 COLL VENOUS BLD VENIPUNCTURE: CPT

## 2020-05-14 PROCEDURE — 99215 OFFICE O/P EST HI 40 MIN: CPT | Mod: 25

## 2020-05-19 LAB
ALBUMIN SERPL ELPH-MCNC: 4.4 G/DL
ALP BLD-CCNC: 91 U/L
ALT SERPL-CCNC: 17 U/L
ANION GAP SERPL CALC-SCNC: 13 MMOL/L
AST SERPL-CCNC: 20 U/L
BASOPHILS # BLD AUTO: 0.1 K/UL
BASOPHILS NFR BLD AUTO: 1.7 %
BILIRUB SERPL-MCNC: 0.3 MG/DL
BUN SERPL-MCNC: 20 MG/DL
CALCIUM SERPL-MCNC: 9.7 MG/DL
CHLORIDE SERPL-SCNC: 103 MMOL/L
CO2 SERPL-SCNC: 26 MMOL/L
CREAT SERPL-MCNC: 0.77 MG/DL
EOSINOPHIL # BLD AUTO: 0.26 K/UL
EOSINOPHIL NFR BLD AUTO: 4.3 %
GLUCOSE SERPL-MCNC: 105 MG/DL
HCT VFR BLD CALC: 43.1 %
HGB BLD-MCNC: 13.4 G/DL
IMM GRANULOCYTES NFR BLD AUTO: 0.2 %
LYMPHOCYTES # BLD AUTO: 1.69 K/UL
LYMPHOCYTES NFR BLD AUTO: 27.9 %
MAN DIFF?: NORMAL
MCHC RBC-ENTMCNC: 29.5 PG
MCHC RBC-ENTMCNC: 31.1 GM/DL
MCV RBC AUTO: 94.7 FL
MONOCYTES # BLD AUTO: 0.55 K/UL
MONOCYTES NFR BLD AUTO: 9.1 %
NEUTROPHILS # BLD AUTO: 3.44 K/UL
NEUTROPHILS NFR BLD AUTO: 56.8 %
PLATELET # BLD AUTO: 216 K/UL
POTASSIUM SERPL-SCNC: 4.7 MMOL/L
PROT SERPL-MCNC: 6.7 G/DL
RBC # BLD: 4.55 M/UL
RBC # FLD: 14.7 %
SODIUM SERPL-SCNC: 143 MMOL/L
WBC # FLD AUTO: 6.05 K/UL

## 2020-05-20 PROBLEM — C50.912 MALIGNANT NEOPLASM OF LEFT FEMALE BREAST, UNSPECIFIED ESTROGEN RECEPTOR STATUS, UNSPECIFIED SITE OF BREAST: Status: ACTIVE | Noted: 2020-03-06

## 2020-05-20 PROBLEM — N30.00 ACUTE CYSTITIS WITHOUT HEMATURIA: Status: RESOLVED | Noted: 2020-04-27 | Resolved: 2020-05-20

## 2020-05-20 PROBLEM — Z86.79 HISTORY OF IRREGULAR HEARTBEAT: Status: RESOLVED | Noted: 2019-10-15 | Resolved: 2020-05-20

## 2020-05-20 PROBLEM — J01.41 ACUTE RECURRENT PANSINUSITIS: Status: RESOLVED | Noted: 2020-03-19 | Resolved: 2020-05-20

## 2020-05-20 RX ORDER — CIPROFLOXACIN HYDROCHLORIDE 250 MG/1
250 TABLET, FILM COATED ORAL
Qty: 14 | Refills: 0 | Status: COMPLETED | COMMUNITY
Start: 2020-04-27 | End: 2020-05-20

## 2020-05-20 RX ORDER — FLUTICASONE PROPIONATE 50 UG/1
50 SPRAY, METERED NASAL DAILY
Qty: 1 | Refills: 0 | Status: COMPLETED | COMMUNITY
Start: 2020-03-19 | End: 2020-05-20

## 2020-05-20 RX ORDER — AMOXICILLIN AND CLAVULANATE POTASSIUM 875; 125 MG/1; MG/1
875-125 TABLET, COATED ORAL
Qty: 20 | Refills: 0 | Status: COMPLETED | COMMUNITY
Start: 2020-03-19 | End: 2020-05-20

## 2020-05-20 NOTE — PHYSICAL EXAM
[No Acute Distress] : no acute distress [Well Nourished] : well nourished [Well Developed] : well developed [Well-Appearing] : well-appearing [Normal Voice/Communication] : normal voice/communication [Normal Sclera/Conjunctiva] : normal sclera/conjunctiva [PERRL] : pupils equal round and reactive to light [EOMI] : extraocular movements intact [Fundoscopic Exam Performed] : fundoscopic ~T exam ~C was performed [Normal Outer Ear/Nose] : the outer ears and nose were normal in appearance [Normal Oropharynx] : the oropharynx was normal [Normal TMs] : both tympanic membranes were normal [Normal Nasal Mucosa] : the nasal mucosa was normal [No JVD] : no jugular venous distention [No Lymphadenopathy] : no lymphadenopathy [Supple] : supple [No Respiratory Distress] : no respiratory distress  [Thyroid Normal, No Nodules] : the thyroid was normal and there were no nodules present [No Accessory Muscle Use] : no accessory muscle use [Clear to Auscultation] : lungs were clear to auscultation bilaterally [Normal Percussion] : the chest was normal to percussion [Normal Rate] : normal rate  [Regular Rhythm] : with a regular rhythm [Normal S1, S2] : normal S1 and S2 [No Murmur] : no murmur heard [No Carotid Bruits] : no carotid bruits [No Abdominal Bruit] : a ~M bruit was not heard ~T in the abdomen [No Varicosities] : no varicosities [Pedal Pulses Present] : the pedal pulses are present [No Edema] : there was no peripheral edema [No Extremity Clubbing/Cyanosis] : no extremity clubbing/cyanosis [No Palpable Aorta] : no palpable aorta [Soft] : abdomen soft [Non Tender] : non-tender [Non-distended] : non-distended [No HSM] : no HSM [Normal Bowel Sounds] : normal bowel sounds [Normal Supraclavicular Nodes] : no supraclavicular lymphadenopathy [Normal Axillary Nodes] : no axillary lymphadenopathy [Normal Posterior Cervical Nodes] : no posterior cervical lymphadenopathy [Normal Anterior Cervical Nodes] : no anterior cervical lymphadenopathy [Normal Inguinal Nodes] : no inguinal lymphadenopathy [Normal Femoral Nodes] : no femoral lymphadenopathy [No CVA Tenderness] : no CVA  tenderness [No Spinal Tenderness] : no spinal tenderness [No Joint Swelling] : no joint swelling [Grossly Normal Strength/Tone] : grossly normal strength/tone [No Rash] : no rash [No Skin Lesions] : no skin lesions [Coordination Grossly Intact] : coordination grossly intact [No Focal Deficits] : no focal deficits [Normal Gait] : normal gait [Speech Grossly Normal] : speech grossly normal [Normal Affect] : the affect was normal [Alert and Oriented x3] : oriented to person, place, and time [Normal Mood] : the mood was normal [Normal Insight/Judgement] : insight and judgment were intact [Normal Appearance] : normal in appearance [No Masses] : no palpable masses [No Nipple Discharge] : no nipple discharge [No Axillary Lymphadenopathy] : no axillary lymphadenopathy [Kyphosis] : no kyphosis [Scoliosis] : no scoliosis [de-identified] : Left breast healed surgical scar [FreeTextEntry1] : Deferred

## 2020-05-20 NOTE — HISTORY OF PRESENT ILLNESS
[No Pertinent Pulmonary History] : no history of asthma, COPD, sleep apnea, or smoking [No Pertinent Cardiac History] : no history of aortic stenosis, atrial fibrillation, coronary artery disease, recent myocardial infarction, or implantable device/pacemaker [No Adverse Anesthesia Reaction] : no adverse anesthesia reaction in self or family member [(Patient denies any chest pain, claudication, dyspnea on exertion, orthopnea, palpitations or syncope)] : Patient denies any chest pain, claudication, dyspnea on exertion, orthopnea, palpitations or syncope [Moderate (4-6 METs)] : Moderate (4-6 METs) [Aortic Stenosis] : no aortic stenosis [Atrial Fibrillation] : no atrial fibrillation [Coronary Artery Disease] : no coronary artery disease [Recent Myocardial Infarction] : no recent myocardial infarction [Implantable Device/Pacemaker] : no implantable device/pacemaker [Asthma] : no asthma [Sleep Apnea] : no sleep apnea [COPD] : no COPD [Smoker] : not a smoker [Family Member] : no family member with adverse anesthesia reaction/sudden death [Chronic Anticoagulation] : no chronic anticoagulation [Self] : no previous adverse anesthesia reaction [Chronic Kidney Disease] : no chronic kidney disease [Diabetes] : no diabetes [FreeTextEntry1] : Expanded left breast lumpectomy [FreeTextEntry2] : 05/22/2020 [FreeTextEntry3] : Rayna Louis MD [FreeTextEntry7] : Holter monitor to assure/20/19 dish(+)APC's. \par Transthoracic echocardiogram-12/04/20-normal LV function with ejection fraction 65-70%, grade I/IV diastolic dysfunction, mild pulmonary valve regurgitation. [FreeTextEntry4] : Patient presents for preoperative medical clearance-expanded left breast lumpectomy-[facility]-05/22/20-Rayna Louis MD. Had left breast mammogram/ultrasound-02/18/20-for evaluation of palpable left breast mass. Results were consistent with malignancy. 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 Had left breast lumpectomy-03/16/20. However, margins were not clean, and patient is rescheduled for a repeat expanded lumpectomy.

## 2020-05-20 NOTE — ASSESSMENT
[High Risk Surgery - Intraperitoneal, Intrathoracic or Supringuinal Vascular Procedures] : High Risk Surgery - Intraperitoneal, Intrathoracic or Supringuinal Vascular Procedures - No (0) [Ischemic Heart Disease] : Ischemic Heart Disease - No (0) [Prior Cerebrovascular Accident or TIA] : Prior Cerebrovascular Accident or TIA - No (0) [Congestive Heart Failure] : Congestive Heart Failure - No (0) [Creatinine >= 2mg/dL (1 Point)] : Creatinine >= 2mg/dL - No (0) [Insulin-dependent Diabetic (1 Point)] : Insulin-dependent Diabetic - No (0) [0] : 0 , RCRI Class: I, Risk of Post-Op Cardiac Complications: 3.9%, 95% CI for Risk Estimate: 2.8% - 5.4% [Patient Optimized for Surgery] : Patient optimized for surgery [No Further Testing Recommended] : no further testing recommended [As per surgery] : as per surgery [FreeTextEntry4] : Patient was examined, and all pre operative testing-EKG and laboratory-were reviewed in detail. Patient is medically cleared for the proposed surgical procedure with acceptable medical risk.\par \par

## 2020-05-20 NOTE — REVIEW OF SYSTEMS
[Negative] : Heme/Lymph [Fever] : no fever [Chills] : no chills [Fatigue] : no fatigue [Hot Flashes] : no hot flashes [Night Sweats] : no night sweats [Recent Change In Weight] : ~T no recent weight change [Discharge] : no discharge [Redness] : no redness [Pain] : no pain [Dryness] : no dryness  [Vision Problems] : no vision problems [Itching] : no itching [Earache] : no earache [Hearing Loss] : no hearing loss [Hoarseness] : no hoarseness [Nosebleed] : no nosebleeds [Nasal Discharge] : no nasal discharge [Postnasal Drip] : no postnasal drip [Chest Pain] : no chest pain [Palpitations] : no palpitations [Leg Claudication] : no leg claudication [Lower Ext Edema] : no lower extremity edema [Orthopnea] : no orthopnea [Shortness Of Breath] : no shortness of breath [Paroxysmal Nocturnal Dyspnea] : no paroxysmal nocturnal dyspnea [Wheezing] : no wheezing [Cough] : no cough [Dyspnea on Exertion] : no dyspnea on exertion [Abdominal Pain] : no abdominal pain [Nausea] : no nausea [Constipation] : no constipation [Diarrhea] : diarrhea [Vomiting] : no vomiting [Heartburn] : no heartburn [Melena] : no melena [Dysuria] : no dysuria [Incontinence] : no incontinence [Nocturia] : no nocturia [Hematuria] : no hematuria [Frequency] : no frequency [Vaginal Discharge] : no vaginal discharge [Joint Pain] : no joint pain [Joint Stiffness] : no joint stiffness [Joint Swelling] : no joint swelling [Muscle Weakness] : no muscle weakness [Muscle Pain] : no muscle pain [Back Pain] : no back pain [Mole Changes] : no mole changes [Nail Changes] : no nail changes [Hair Changes] : no hair changes [Skin Rash] : no skin rash [Headache] : no headache [Dizziness] : no dizziness [Fainting] : no fainting [Memory Loss] : no memory loss [Confusion] : no confusion [Unsteady Walking] : no ataxia [Suicidal] : not suicidal [Insomnia] : no insomnia [Anxiety] : no anxiety [Depression] : no depression [Easy Bleeding] : no easy bleeding [Easy Bruising] : no easy bruising [Swollen Glands] : no swollen glands

## 2020-07-01 DIAGNOSIS — J15.9 UNSPECIFIED BACTERIAL PNEUMONIA: ICD-10-CM

## 2020-07-27 ENCOUNTER — HOSPITAL ENCOUNTER (EMERGENCY)
Dept: HOSPITAL 74 - JERFT | Age: 75
Discharge: HOME | End: 2020-07-27
Payer: COMMERCIAL

## 2020-07-27 VITALS — DIASTOLIC BLOOD PRESSURE: 68 MMHG | TEMPERATURE: 98.5 F | HEART RATE: 69 BPM | SYSTOLIC BLOOD PRESSURE: 129 MMHG

## 2020-07-27 VITALS — BODY MASS INDEX: 22.6 KG/M2

## 2020-07-27 DIAGNOSIS — S82.64XA: Primary | ICD-10-CM

## 2020-07-27 PROCEDURE — 2W3QX1Z IMMOBILIZATION OF RIGHT LOWER LEG USING SPLINT: ICD-10-PCS | Performed by: NURSE PRACTITIONER

## 2020-07-27 NOTE — PDOC
Rapid Medical Evaluation


Time Seen by Provider: 07/27/20 10:36


Medical Evaluation: 


                                    Allergies











Allergy/AdvReac Type Severity Reaction Status Date / Time


 


erythromycin base Allergy Intermediate  Verified 03/28/20 00:16











07/27/20 10:36


Pt presents for evaluation of R ankle and foot pain after twisting it at the 

[employer] three days ago. 


Exam: brace over R ankle, walking with limp


Orders: x-ray


Pt to proceed to the ER for further evaluation











**Discharge Disposition





- Diagnosis


Right ankle pain


Qualifiers:


 Chronicity: acute Qualified Code(s): M25.571 - Pain in right ankle and joints 

of right foot








- Referrals





- Patient Instructions





- Post Discharge Activity

## 2020-07-27 NOTE — PDOC
History of Present Illness





- General


Chief Complaint: Pain


Stated Complaint: R/FOOT INJURY


Time Seen by Provider: 07/27/20 10:36


History Source: Patient, Old Records


Exam Limitations: No Limitations





- History of Present Illness


Initial Comments: 





07/27/20 11:26





HISTORY OF PRESENT ILLNESS: 75-year-old woman with past medical history of 

breast CA (currently on tamoxifen), hypertension, osteoporosis, COVID-19 3/20 

who presents emergency department for evaluation of right ankle pain for 2 days 

status post inversion injury while stepping off a curb.  Patient has been 

ambulatory since the initial injury but is concerned that the pain is not 

improving and the swelling has worsened.





No recent travel or sick contacts. 


PAST MEDICAL HISTORY: See HPI


SURGICAL HISTORY: Denies


ALLERGIES: Erythromycin





REVIEW OF SYSTEMS


General/Constitutional: Denies fever or chills. Denies weakness, weight change.


HEENT: Denies change in vision. Denies ear pain or discharge. Denies sore 

throat.


Cardiovascular: Denies chest pain or shortness of breath.


Respiratory: Denies cough, wheezing, or hemoptysis.


Gastrointestinal: Denies nausea, vomiting, diarrhea or constipation. Denies 

rectal bleeding.


Genitourinary: Denies dysuria, frequency, or change in urination.


Musculoskeletal: See HPI


Skin and breasts: Denies rash or easy bruising.


Neurologic: Denies headache, vertigo, loss of consciousness, or loss of 

sensation.


Psychiatric: Denies depression or anxiety.


Endocrine: Denies increased thirst. Denies abnormal weight change.


Hematologic/Lymphatic: Denies anemia, easy bleeding, or history of blood clots.


Allergic/Immunologic: Denies hives or skin allergy. Denies latex allergy.











PHYSICAL EXAM


General Appearance: Well-appearing, appropriately dressed.  No apparent 

distress, no intoxication.


Vascular Pulses: Dorsalis-Pedis (R): 2+, Dorsalis-Pedis (L): 2+


Gastrointestinal/Abdominal: Normal bowel sounds. Abdomen soft, non-distended.  

No tenderness or rebound tenderness. No organomegaly, pulsatile mass, guarding, 

hernia, hepatomegaly, splenomegaly.


Musculoskeletal/Extremities:  Swelling present over the lateral malleolus of the

right ankle.  Full range of motion noted.  Tenderness to palpation upon 

palpation of the right lateral lower leg.  No bony deformity or crepitus present

upon palpation of the right ankle.  Neurovascularly intact. 


Integumentary: Appropriate color, dry, warm.  No cyanosis, erythema, jaundice or

rash








Past History





- Medical History


Allergies/Adverse Reactions: 


                                    Allergies











Allergy/AdvReac Type Severity Reaction Status Date / Time


 


erythromycin base Allergy Intermediate  Verified 03/28/20 00:16











Home Medications: 


Ambulatory Orders





Tamoxifen Citrate 20 mg PO DAILY 07/27/20 








Cancer: Yes (left breast)





- Psycho-Social/Smoking History


Smoking History: Never smoked


Have you smoked in the past 12 months: No


Information on smoking cessation initiated: No





- Substance Abuse Hx (Audit-C & DAST Scrn)


How often the patient has a drink containing alcohol: Never


Score: In Men: 4 or > Positive; In Women: 3 or > Positive: 0


Screen Result (Pos requires Nsg. Audit-10AR): Negative





*Physical Exam





- Vital Signs


                                Last Vital Signs











Temp Pulse Resp BP Pulse Ox


 


 98.5 F   69   16   129/68   99 


 


 07/27/20 10:34  07/27/20 10:34  07/27/20 10:34  07/27/20 10:34  07/27/20 10:34














Procedures





- Consent


Consent obtained: Verbal, From Patient





- Splinting


Splint Location: Right: Ankle


Pre-Proc Neuro Vasc Exam: normal


Hand-Made Type: orthoglass


Splint Type: Yes: Sugar Tong (right), Short Leg (right)


Post-Proc Neuro Vasc Exam: normal, unchanged from pre-exam


Progress: 





07/27/20 11:42


Patient tolerated well





Medical Decision Making





- Medical Decision Making





07/27/20 11:29


A/P:


75-year-old woman with right lateral ankle pain for 2 days





Tender to palpation over the lateral aspect of the right lower leg extending 

from the ankle to the mid lower leg.  No tenderness upon palpation of the knee. 

Full range of motion noted to the ankle although patient does report increased 

pain.


No bony deformity, crepitus or step-off present upon palpation of the bones of 

the right lower leg, right ankle or right foot.


Neurovascularly intact





X-rays performed at [facility] read by me: Distal fibula fracture 

at the styloid.





Splint-see procedure note for details


Discharge home with orthopedic follow-up





I discussed the physical exam findings, ancillary test results and final 

diagnoses with the patient. I answered all of the patient's questions. The 

patient was satisfied with the care received and felt comfortable with the 

discharge plan and treatment plan. The patient will call their primary care 

physician within 24 hours to arrange follow-up and will return to the Emergency 

Department with any new, persistent or worsening symptoms.





Portions of this note have been documented using voice recognition software. As 

a result, errors may occur in the transcription process. Effort has been made to

correct all grammatical and transcription error, but some may have been missed 

which may produce sporadic inaccurate transcription or nonsensical phrases.





Discharge





- Discharge Information


Problems reviewed: Yes


Clinical Impression/Diagnosis: 


Fibula fracture


Qualifiers:


 Encounter type: initial encounter Fibula location: lateral malleolus Fracture 

type: closed Fracture alignment: nondisplaced Laterality: right Qualified 

Code(s): S82.64XA - Nondisplaced fracture of lateral malleolus of right fibula, 

initial encounter for closed fracture





Condition: Fair


Disposition: HOME





- Admission


No





- Follow up/Referral


Referrals: 


Adrian Stiles MD [Staff Physician] - 





- Patient Discharge Instructions


Additional Instructions: 


You be given a referral for an orthopedist.  Call to schedule appointment for 

reevaluation of your knee pain.


Your emergency department visit is incomplete until you follow-up with your 

regular doctor.


Take Tylenol 2-500 mg tablets every 6 hours as needed for pain.


Take Motrin 3-200 mg tablets every 6 hours as needed for pain.


These medications do not require a prescription as they are over-the-counter.


Apply ice to affected areas to help relieve pain.  Do not leave ice on for more 

than 20 minutes at a time.


Return to the emergency department for any new or worsening symptoms.


Thank you very much for choosing us to provide your emergent health care needs.








- Post Discharge Activity

## 2020-10-15 ENCOUNTER — APPOINTMENT (OUTPATIENT)
Dept: FAMILY MEDICINE | Facility: CLINIC | Age: 75
End: 2020-10-15
Payer: MEDICARE

## 2020-10-15 PROCEDURE — G0008: CPT

## 2020-10-15 PROCEDURE — 90686 IIV4 VACC NO PRSV 0.5 ML IM: CPT

## 2020-10-15 NOTE — HISTORY OF PRESENT ILLNESS
[FreeTextEntry1] : Influenza immunization [de-identified] : Patient presents for influenza immunization. No contraindications to administration.

## 2020-10-22 PROBLEM — Z00.00 MEDICARE ANNUAL WELLNESS VISIT, SUBSEQUENT: Status: RESOLVED | Noted: 2019-03-11 | Resolved: 2020-10-22

## 2020-11-25 ENCOUNTER — NON-APPOINTMENT (OUTPATIENT)
Age: 75
End: 2020-11-25

## 2020-11-25 ENCOUNTER — APPOINTMENT (OUTPATIENT)
Dept: OPHTHALMOLOGY | Facility: CLINIC | Age: 75
End: 2020-11-25
Payer: MEDICARE

## 2020-11-25 PROCEDURE — 92083 EXTENDED VISUAL FIELD XM: CPT

## 2020-11-25 PROCEDURE — 92133 CPTRZD OPH DX IMG PST SGM ON: CPT

## 2020-11-25 PROCEDURE — 92014 COMPRE OPH EXAM EST PT 1/>: CPT

## 2021-01-02 LAB
SARS-COV-2 IGG SERPL IA-ACNC: 6.39 INDEX
SARS-COV-2 IGG SERPL QL IA: POSITIVE

## 2021-02-25 ENCOUNTER — NON-APPOINTMENT (OUTPATIENT)
Age: 76
End: 2021-02-25

## 2021-02-25 ENCOUNTER — APPOINTMENT (OUTPATIENT)
Dept: OPHTHALMOLOGY | Facility: CLINIC | Age: 76
End: 2021-02-25
Payer: MEDICARE

## 2021-02-25 PROCEDURE — 92014 COMPRE OPH EXAM EST PT 1/>: CPT

## 2021-02-25 PROCEDURE — 92083 EXTENDED VISUAL FIELD XM: CPT

## 2021-02-25 PROCEDURE — 92250 FUNDUS PHOTOGRAPHY W/I&R: CPT

## 2021-03-05 ENCOUNTER — APPOINTMENT (OUTPATIENT)
Dept: FAMILY MEDICINE | Facility: CLINIC | Age: 76
End: 2021-03-05
Payer: MEDICARE

## 2021-03-05 VITALS
HEART RATE: 85 BPM | DIASTOLIC BLOOD PRESSURE: 80 MMHG | OXYGEN SATURATION: 96 % | BODY MASS INDEX: 21.05 KG/M2 | SYSTOLIC BLOOD PRESSURE: 140 MMHG | WEIGHT: 131 LBS | HEIGHT: 66 IN | RESPIRATION RATE: 17 BRPM | TEMPERATURE: 96.5 F

## 2021-03-05 DIAGNOSIS — Z20.822 CONTACT WITH AND (SUSPECTED) EXPOSURE TO COVID-19: ICD-10-CM

## 2021-03-05 PROCEDURE — 99213 OFFICE O/P EST LOW 20 MIN: CPT | Mod: 25,CS

## 2021-03-05 PROCEDURE — 36415 COLL VENOUS BLD VENIPUNCTURE: CPT

## 2021-03-06 PROBLEM — Z20.822 EXPOSURE TO COVID-19 VIRUS: Status: ACTIVE | Noted: 2020-12-29

## 2021-03-06 LAB
25(OH)D3 SERPL-MCNC: 70.3 NG/ML
ALBUMIN SERPL ELPH-MCNC: 4.2 G/DL
ALP BLD-CCNC: 54 U/L
ALT SERPL-CCNC: 16 U/L
ANION GAP SERPL CALC-SCNC: 14 MMOL/L
AST SERPL-CCNC: 20 U/L
BASOPHILS # BLD AUTO: 0.07 K/UL
BASOPHILS NFR BLD AUTO: 1.3 %
BILIRUB SERPL-MCNC: 0.3 MG/DL
BUN SERPL-MCNC: 20 MG/DL
CALCIUM SERPL-MCNC: 10 MG/DL
CHLORIDE SERPL-SCNC: 107 MMOL/L
CHOLEST SERPL-MCNC: 183 MG/DL
CO2 SERPL-SCNC: 24 MMOL/L
CREAT SERPL-MCNC: 0.98 MG/DL
EOSINOPHIL # BLD AUTO: 0.16 K/UL
EOSINOPHIL NFR BLD AUTO: 2.9 %
ESTIMATED AVERAGE GLUCOSE: 123 MG/DL
FOLATE SERPL-MCNC: >20 NG/ML
GLUCOSE SERPL-MCNC: 75 MG/DL
HBA1C MFR BLD HPLC: 5.9 %
HCT VFR BLD CALC: 42.4 %
HDLC SERPL-MCNC: 59 MG/DL
HGB BLD-MCNC: 13.6 G/DL
IMM GRANULOCYTES NFR BLD AUTO: 0.2 %
LDLC SERPL CALC-MCNC: 104 MG/DL
LYMPHOCYTES # BLD AUTO: 1.63 K/UL
LYMPHOCYTES NFR BLD AUTO: 29.9 %
MAGNESIUM SERPL-MCNC: 2.1 MG/DL
MAN DIFF?: NORMAL
MCHC RBC-ENTMCNC: 30.6 PG
MCHC RBC-ENTMCNC: 32.1 GM/DL
MCV RBC AUTO: 95.5 FL
MONOCYTES # BLD AUTO: 0.56 K/UL
MONOCYTES NFR BLD AUTO: 10.3 %
NEUTROPHILS # BLD AUTO: 3.02 K/UL
NEUTROPHILS NFR BLD AUTO: 55.4 %
NONHDLC SERPL-MCNC: 125 MG/DL
PLATELET # BLD AUTO: 157 K/UL
POTASSIUM SERPL-SCNC: 4.9 MMOL/L
PROT SERPL-MCNC: 6.8 G/DL
RBC # BLD: 4.44 M/UL
RBC # FLD: 14.3 %
SARS-COV-2 IGG SERPL IA-ACNC: 191 INDEX
SARS-COV-2 IGG SERPL QL IA: POSITIVE
SODIUM SERPL-SCNC: 145 MMOL/L
TRIGL SERPL-MCNC: 105 MG/DL
TSH SERPL-ACNC: 4.2 UIU/ML
VIT B12 SERPL-MCNC: 360 PG/ML
WBC # FLD AUTO: 5.45 K/UL

## 2021-03-06 NOTE — PHYSICAL EXAM
[No Acute Distress] : no acute distress [Well Nourished] : well nourished [Well Developed] : well developed [Well-Appearing] : well-appearing [Normal Voice/Communication] : normal voice/communication [Normal Sclera/Conjunctiva] : normal sclera/conjunctiva [PERRL] : pupils equal round and reactive to light [EOMI] : extraocular movements intact [No JVD] : no jugular venous distention [Supple] : supple [No Respiratory Distress] : no respiratory distress  [Clear to Auscultation] : lungs were clear to auscultation bilaterally [Normal Rate] : normal rate  [Regular Rhythm] : with a regular rhythm [Normal S1, S2] : normal S1 and S2 [No Murmur] : no murmur heard [No Carotid Bruits] : no carotid bruits [Pedal Pulses Present] : the pedal pulses are present [No Edema] : there was no peripheral edema [Soft] : abdomen soft [Non Tender] : non-tender [No HSM] : no HSM [Normal Axillary Nodes] : no axillary lymphadenopathy [Normal Posterior Cervical Nodes] : no posterior cervical lymphadenopathy [Normal Anterior Cervical Nodes] : no anterior cervical lymphadenopathy [Normal Inguinal Nodes] : no inguinal lymphadenopathy [Grossly Normal Strength/Tone] : grossly normal strength/tone [Coordination Grossly Intact] : coordination grossly intact [No Focal Deficits] : no focal deficits [Normal Gait] : normal gait [Speech Grossly Normal] : speech grossly normal

## 2021-03-06 NOTE — HISTORY OF PRESENT ILLNESS
[Spouse] : spouse [FreeTextEntry1] : General laboratory testing and followup of COVID-19 antibodies. [de-identified] : Patient presents for general laboratory testing and followup of COVID-19 antibodies. Feels well in general. No new symptoms. As well no prescription medications. Several months ago was noted to have a relatively modest level of COVID-19 antibodies. Will be getting immunizations soon, and wishes to know what the antibody levels are now. Expresses concern about reinfection.

## 2021-03-06 NOTE — REVIEW OF SYSTEMS
[Negative] : Heme/Lymph [Recent Change In Weight] : ~T no recent weight change [Skin Rash] : no skin rash [Swollen Glands] : no swollen glands

## 2021-11-01 ENCOUNTER — APPOINTMENT (OUTPATIENT)
Dept: FAMILY MEDICINE | Facility: CLINIC | Age: 76
End: 2021-11-01
Payer: MEDICARE

## 2021-11-01 DIAGNOSIS — Z23 ENCOUNTER FOR IMMUNIZATION: ICD-10-CM

## 2021-11-01 PROCEDURE — 90662 IIV NO PRSV INCREASED AG IM: CPT

## 2021-11-01 PROCEDURE — G0008: CPT

## 2022-02-08 ENCOUNTER — APPOINTMENT (OUTPATIENT)
Dept: OPHTHALMOLOGY | Facility: CLINIC | Age: 77
End: 2022-02-08
Payer: MEDICARE

## 2022-02-08 ENCOUNTER — NON-APPOINTMENT (OUTPATIENT)
Age: 77
End: 2022-02-08

## 2022-02-08 PROCEDURE — 92133 CPTRZD OPH DX IMG PST SGM ON: CPT

## 2022-02-08 PROCEDURE — 92014 COMPRE OPH EXAM EST PT 1/>: CPT

## 2022-02-08 PROCEDURE — 92083 EXTENDED VISUAL FIELD XM: CPT

## 2022-05-23 ENCOUNTER — APPOINTMENT (OUTPATIENT)
Dept: FAMILY MEDICINE | Facility: CLINIC | Age: 77
End: 2022-05-23
Payer: MEDICARE

## 2022-05-23 VITALS
DIASTOLIC BLOOD PRESSURE: 80 MMHG | TEMPERATURE: 98 F | HEART RATE: 82 BPM | OXYGEN SATURATION: 97 % | WEIGHT: 132 LBS | HEIGHT: 64 IN | BODY MASS INDEX: 22.53 KG/M2 | SYSTOLIC BLOOD PRESSURE: 140 MMHG

## 2022-05-23 DIAGNOSIS — R05.3 CHRONIC COUGH: ICD-10-CM

## 2022-05-23 PROCEDURE — 99212 OFFICE O/P EST SF 10 MIN: CPT

## 2022-05-23 RX ORDER — LEVOFLOXACIN 500 MG/1
500 TABLET, FILM COATED ORAL DAILY
Qty: 10 | Refills: 0 | Status: COMPLETED | COMMUNITY
Start: 2020-07-01 | End: 2022-05-23

## 2022-07-07 ENCOUNTER — APPOINTMENT (OUTPATIENT)
Dept: GASTROENTEROLOGY | Facility: CLINIC | Age: 77
End: 2022-07-07

## 2022-07-07 VITALS
HEART RATE: 78 BPM | BODY MASS INDEX: 22.53 KG/M2 | WEIGHT: 132 LBS | SYSTOLIC BLOOD PRESSURE: 116 MMHG | TEMPERATURE: 97.7 F | HEIGHT: 64 IN | DIASTOLIC BLOOD PRESSURE: 66 MMHG | OXYGEN SATURATION: 78 %

## 2022-07-07 DIAGNOSIS — Z12.11 ENCOUNTER FOR SCREENING FOR MALIGNANT NEOPLASM OF COLON: ICD-10-CM

## 2022-07-07 PROCEDURE — 99203 OFFICE O/P NEW LOW 30 MIN: CPT

## 2022-07-07 NOTE — ASSESSMENT
[FreeTextEntry1] : Will plan on a colonoscopy for screening.  Explained risks/benefits/alternatives including not limited to bleeding, infection, perforation, missed lesions, anesthesia complications.  Patient understands and agrees, all questions answered.  Will use a split dose Mg citrate prep with clears the day prior.\par \par Thank you for referring Ms. KOTHARI.  Please do not hesitate to call to further discuss his/her care.\par \par Note faxed to requesting MD.\par \par

## 2022-07-07 NOTE — HISTORY OF PRESENT ILLNESS
[FreeTextEntry1] : Ms. Anne is a pleasant 77F h/o breast cancer s/p lumpectomy 3/20, osteoporosis, ankle fracture, HLD, appendectomy who is referred by Dr. Stark for colon cancer screening.\par \par Last colonoscopy was 6/17 with Dr. Luna, brings a short writter report stating she had 1 large polyp removed in addition to smaller polyps, hemorrhoids, "redundant" colon. Was recommended to have a repeat in 5 years.\par \par Reports mild constipation, has small pellet like brown stool, improves with prunes.\par \par Has difficulty drinking large quantities, requests a small volume prep.\par \par Does not smoke or drink.\par \par No FHx of any GI malignancies.

## 2022-08-27 ENCOUNTER — RESULT REVIEW (OUTPATIENT)
Age: 77
End: 2022-08-27

## 2022-08-29 ENCOUNTER — RESULT REVIEW (OUTPATIENT)
Age: 77
End: 2022-08-29

## 2022-08-30 ENCOUNTER — APPOINTMENT (OUTPATIENT)
Dept: GASTROENTEROLOGY | Facility: HOSPITAL | Age: 77
End: 2022-08-30

## 2022-09-28 ENCOUNTER — RX ONLY (RX ONLY)
Age: 77
End: 2022-09-28

## 2022-09-28 ENCOUNTER — OFFICE (OUTPATIENT)
Dept: URBAN - METROPOLITAN AREA CLINIC 30 | Facility: CLINIC | Age: 77
Setting detail: OPHTHALMOLOGY
End: 2022-09-28
Payer: MEDICARE

## 2022-09-28 DIAGNOSIS — H01.004: ICD-10-CM

## 2022-09-28 DIAGNOSIS — H25.12: ICD-10-CM

## 2022-09-28 DIAGNOSIS — H40.1131: ICD-10-CM

## 2022-09-28 DIAGNOSIS — H01.001: ICD-10-CM

## 2022-09-28 DIAGNOSIS — H35.373: ICD-10-CM

## 2022-09-28 PROCEDURE — 92133 CPTRZD OPH DX IMG PST SGM ON: CPT | Performed by: OPHTHALMOLOGY

## 2022-09-28 PROCEDURE — 92004 COMPRE OPH EXAM NEW PT 1/>: CPT | Performed by: OPHTHALMOLOGY

## 2022-09-28 PROCEDURE — 76514 ECHO EXAM OF EYE THICKNESS: CPT | Performed by: OPHTHALMOLOGY

## 2022-09-28 PROCEDURE — 92020 GONIOSCOPY: CPT | Performed by: OPHTHALMOLOGY

## 2022-09-28 ASSESSMENT — LID EXAM ASSESSMENTS
OD_BLEPHARITIS: RUL 1+
OS_BLEPHARITIS: LUL 1+

## 2022-09-28 ASSESSMENT — VISUAL ACUITY
OD_BCVA: 20/30+2
OS_BCVA: 20/25+2

## 2022-09-28 ASSESSMENT — REFRACTION_CURRENTRX
OS_AXIS: 175
OD_OVR_VA: 20/
OS_CYLINDER: +0.50
OS_ADD: +2.25
OD_SPHERE: -0.75
OS_SPHERE: +1.75
OD_AXIS: 10
OS_OVR_VA: 20/
OD_ADD: +2.25
OD_CYLINDER: +2.00

## 2022-09-28 ASSESSMENT — REFRACTION_AUTOREFRACTION
OS_CYLINDER: +1.50
OD_AXIS: 180
OD_SPHERE: -0.75
OS_SPHERE: +2.00
OD_CYLINDER: +1.25
OS_AXIS: 15

## 2022-09-28 ASSESSMENT — REFRACTION_MANIFEST
OS_VA1: 20/30
OS_AXIS: 180
OS_SPHERE: +1.00
OD_AXIS: 180
OS_CYLINDER: +1.50
OD_CYLINDER: +1.25
OD_SPHERE: -0.75
OD_VA1: 20/25+

## 2022-09-28 ASSESSMENT — SPHEQUIV_DERIVED
OD_SPHEQUIV: -0.125
OD_SPHEQUIV: -0.125
OS_SPHEQUIV: 1.75
OS_SPHEQUIV: 2.75

## 2022-09-28 ASSESSMENT — PACHYMETRY
OS_CT_UM: 554
OD_CT_UM: 557
OD_CT_CORRECTION: -1
OS_CT_CORRECTION: -1

## 2022-09-28 ASSESSMENT — TONOMETRY
OS_IOP_MMHG: 18
OD_IOP_MMHG: 18

## 2022-09-28 ASSESSMENT — CONFRONTATIONAL VISUAL FIELD TEST (CVF)
OS_FINDINGS: FULL
OD_FINDINGS: FULL

## 2022-09-28 ASSESSMENT — CORNEAL DYSTROPHY - POSTERIOR
OS_POSTERIORDYSTROPHY: T GUTTATA
OD_POSTERIORDYSTROPHY: T GUTTATA

## 2022-10-27 ENCOUNTER — APPOINTMENT (OUTPATIENT)
Dept: INTERNAL MEDICINE | Facility: CLINIC | Age: 77
End: 2022-10-27

## 2022-10-27 PROCEDURE — G0008: CPT

## 2022-10-27 PROCEDURE — 90662 IIV NO PRSV INCREASED AG IM: CPT

## 2022-11-10 ENCOUNTER — APPOINTMENT (OUTPATIENT)
Dept: INTERNAL MEDICINE | Facility: CLINIC | Age: 77
End: 2022-11-10

## 2022-11-10 VITALS
WEIGHT: 132 LBS | HEIGHT: 64 IN | HEART RATE: 74 BPM | DIASTOLIC BLOOD PRESSURE: 76 MMHG | TEMPERATURE: 97.7 F | BODY MASS INDEX: 22.53 KG/M2 | SYSTOLIC BLOOD PRESSURE: 110 MMHG | OXYGEN SATURATION: 98 %

## 2022-11-10 DIAGNOSIS — G47.62 SLEEP RELATED LEG CRAMPS: ICD-10-CM

## 2022-11-10 DIAGNOSIS — M79.10 MYALGIA, UNSPECIFIED SITE: ICD-10-CM

## 2022-11-10 PROCEDURE — 36415 COLL VENOUS BLD VENIPUNCTURE: CPT

## 2022-11-10 PROCEDURE — 99213 OFFICE O/P EST LOW 20 MIN: CPT | Mod: 25

## 2022-11-10 RX ORDER — TAMOXIFEN CITRATE 20 MG/1
20 TABLET, FILM COATED ORAL
Qty: 90 | Refills: 0 | Status: ACTIVE | COMMUNITY
Start: 2022-09-15

## 2022-11-10 NOTE — HISTORY OF PRESENT ILLNESS
[FreeTextEntry1] : CC: backache and neck pain [de-identified] : Patient reports URI symptoms over the weekend, sore throat, congestion, cough, after a few days she has developed severe backache and neck pain, pain is only with movement, described as soreness, slightly better today, hot shower and advil help, soreness is isolated to the neck, and back, no other joint/muscle pain or swelling. No fevers, sore throat and congestion have resolved. \par \par She has cramps in her legs as well, mostly at night, no achiness, started a few months ago, getting worse, no claudication or cramping associated w/ activity.

## 2022-11-10 NOTE — PHYSICAL EXAM
[No JVD] : no jugular venous distention [No Lymphadenopathy] : no lymphadenopathy [Normal] : normal rate, regular rhythm, normal S1 and S2 and no murmur heard [No Spinal Tenderness] : no spinal tenderness [No Joint Swelling] : no joint swelling [de-identified] : + tenderness BL lateral neck,  [de-identified] : + tenderness BL lumbar paraspinal muscles.  [de-identified] : Tenderness in the neck and back, no other muscle tenderness.  [de-identified] : 4/5 strength BP upper and LE, sensation grossly intact

## 2022-11-11 ENCOUNTER — TRANSCRIPTION ENCOUNTER (OUTPATIENT)
Age: 77
End: 2022-11-11

## 2022-11-11 LAB
25(OH)D3 SERPL-MCNC: 67 NG/ML
ALBUMIN SERPL ELPH-MCNC: 3.6 G/DL
ALP BLD-CCNC: 56 U/L
ALT SERPL-CCNC: 16 U/L
ANION GAP SERPL CALC-SCNC: 11 MMOL/L
AST SERPL-CCNC: 17 U/L
BASOPHILS # BLD AUTO: 0.08 K/UL
BASOPHILS NFR BLD AUTO: 1 %
BILIRUB SERPL-MCNC: 0.3 MG/DL
BUN SERPL-MCNC: 16 MG/DL
CALCIUM SERPL-MCNC: 9.3 MG/DL
CHLORIDE SERPL-SCNC: 106 MMOL/L
CK SERPL-CCNC: 51 U/L
CO2 SERPL-SCNC: 26 MMOL/L
CREAT SERPL-MCNC: 0.88 MG/DL
EGFR: 67 ML/MIN/1.73M2
EOSINOPHIL # BLD AUTO: 0.09 K/UL
EOSINOPHIL NFR BLD AUTO: 1.1 %
GLUCOSE SERPL-MCNC: 90 MG/DL
HCT VFR BLD CALC: 39.4 %
HGB BLD-MCNC: 12.6 G/DL
IMM GRANULOCYTES NFR BLD AUTO: 0.4 %
LYMPHOCYTES # BLD AUTO: 1.59 K/UL
LYMPHOCYTES NFR BLD AUTO: 19 %
MAGNESIUM SERPL-MCNC: 2.1 MG/DL
MAN DIFF?: NORMAL
MCHC RBC-ENTMCNC: 29.6 PG
MCHC RBC-ENTMCNC: 32 GM/DL
MCV RBC AUTO: 92.7 FL
MONOCYTES # BLD AUTO: 1 K/UL
MONOCYTES NFR BLD AUTO: 12 %
NEUTROPHILS # BLD AUTO: 5.56 K/UL
NEUTROPHILS NFR BLD AUTO: 66.5 %
PLATELET # BLD AUTO: 174 K/UL
POTASSIUM SERPL-SCNC: 4.7 MMOL/L
PROT SERPL-MCNC: 6.4 G/DL
RBC # BLD: 4.25 M/UL
RBC # FLD: 14 %
SODIUM SERPL-SCNC: 142 MMOL/L
WBC # FLD AUTO: 8.35 K/UL

## 2022-11-18 DIAGNOSIS — S16.1XXD STRAIN OF MUSCLE, FASCIA AND TENDON AT NECK LEVEL, SUBSEQUENT ENCOUNTER: ICD-10-CM

## 2022-11-18 DIAGNOSIS — S39.012D STRAIN OF MUSCLE, FASCIA AND TENDON OF LOWER BACK, SUBSEQUENT ENCOUNTER: ICD-10-CM

## 2022-11-18 RX ORDER — TIZANIDINE 2 MG/1
2 TABLET ORAL
Qty: 60 | Refills: 0 | Status: ACTIVE | COMMUNITY
Start: 2022-11-18 | End: 1900-01-01

## 2022-11-23 ENCOUNTER — OFFICE (OUTPATIENT)
Dept: URBAN - METROPOLITAN AREA CLINIC 30 | Facility: CLINIC | Age: 77
Setting detail: OPHTHALMOLOGY
End: 2022-11-23
Payer: MEDICARE

## 2022-11-23 DIAGNOSIS — H35.373: ICD-10-CM

## 2022-11-23 DIAGNOSIS — H01.001: ICD-10-CM

## 2022-11-23 DIAGNOSIS — H01.004: ICD-10-CM

## 2022-11-23 DIAGNOSIS — H40.1131: ICD-10-CM

## 2022-11-23 DIAGNOSIS — H25.12: ICD-10-CM

## 2022-11-23 DIAGNOSIS — H52.213: ICD-10-CM

## 2022-11-23 PROCEDURE — 92025 CPTRIZED CORNEAL TOPOGRAPHY: CPT | Performed by: OPHTHALMOLOGY

## 2022-11-23 PROCEDURE — 92134 CPTRZ OPH DX IMG PST SGM RTA: CPT | Performed by: OPHTHALMOLOGY

## 2022-11-23 PROCEDURE — 92136 OPHTHALMIC BIOMETRY: CPT | Performed by: OPHTHALMOLOGY

## 2022-11-23 PROCEDURE — 99213 OFFICE O/P EST LOW 20 MIN: CPT | Performed by: OPHTHALMOLOGY

## 2022-11-23 ASSESSMENT — VISUAL ACUITY
OD_BCVA: 20/40
OS_BCVA: 20/40+1

## 2022-11-23 ASSESSMENT — PACHYMETRY
OD_CT_UM: 557
OS_CT_CORRECTION: -1
OS_CT_UM: 554
OD_CT_CORRECTION: -1

## 2022-11-23 ASSESSMENT — LID EXAM ASSESSMENTS
OS_BLEPHARITIS: LUL 1+
OD_BLEPHARITIS: RUL 1+

## 2022-11-23 ASSESSMENT — REFRACTION_MANIFEST
OD_CYLINDER: +1.25
OS_SPHERE: +1.00
OS_CYLINDER: +1.50
OD_AXIS: 180
OD_SPHERE: -0.75
OS_AXIS: 180
OS_VA1: 20/30
OD_VA1: 20/25+

## 2022-11-23 ASSESSMENT — REFRACTION_CURRENTRX
OD_ADD: +2.25
OD_CYLINDER: +2.00
OD_AXIS: 10
OS_CYLINDER: +0.50
OS_SPHERE: +1.75
OS_AXIS: 175
OS_OVR_VA: 20/
OD_OVR_VA: 20/
OD_SPHERE: -0.75
OS_ADD: +2.25

## 2022-11-23 ASSESSMENT — SPHEQUIV_DERIVED
OD_SPHEQUIV: -0.125
OS_SPHEQUIV: 1.75
OD_SPHEQUIV: -0.125
OS_SPHEQUIV: 2.75

## 2022-11-23 ASSESSMENT — TONOMETRY
OD_IOP_MMHG: 15
OS_IOP_MMHG: 17

## 2022-11-23 ASSESSMENT — CONFRONTATIONAL VISUAL FIELD TEST (CVF)
OD_FINDINGS: FULL
OS_FINDINGS: FULL

## 2022-11-23 ASSESSMENT — REFRACTION_AUTOREFRACTION
OS_CYLINDER: +1.50
OS_SPHERE: +2.00
OS_AXIS: 15
OD_SPHERE: -0.75
OD_AXIS: 180
OD_CYLINDER: +1.25

## 2022-11-23 ASSESSMENT — CORNEAL DYSTROPHY - POSTERIOR
OS_POSTERIORDYSTROPHY: T GUTTATA
OD_POSTERIORDYSTROPHY: T GUTTATA

## 2022-12-11 DIAGNOSIS — S29.019A STRAIN OF MUSCLE AND TENDON OF UNSPECIFIED WALL OF THORAX, INITIAL ENCOUNTER: ICD-10-CM

## 2022-12-11 DIAGNOSIS — S16.1XXA STRAIN OF MUSCLE, FASCIA AND TENDON AT NECK LEVEL, INITIAL ENCOUNTER: ICD-10-CM

## 2022-12-11 DIAGNOSIS — S39.012A STRAIN OF MUSCLE, FASCIA AND TENDON OF LOWER BACK, INITIAL ENCOUNTER: ICD-10-CM

## 2023-02-08 ENCOUNTER — OFFICE (OUTPATIENT)
Dept: URBAN - METROPOLITAN AREA CLINIC 30 | Facility: CLINIC | Age: 78
Setting detail: OPHTHALMOLOGY
End: 2023-02-08
Payer: MEDICARE

## 2023-02-08 DIAGNOSIS — H01.004: ICD-10-CM

## 2023-02-08 DIAGNOSIS — H52.213: ICD-10-CM

## 2023-02-08 DIAGNOSIS — H25.12: ICD-10-CM

## 2023-02-08 DIAGNOSIS — H35.373: ICD-10-CM

## 2023-02-08 DIAGNOSIS — H40.1131: ICD-10-CM

## 2023-02-08 DIAGNOSIS — H01.001: ICD-10-CM

## 2023-02-08 PROCEDURE — 92134 CPTRZ OPH DX IMG PST SGM RTA: CPT | Performed by: OPHTHALMOLOGY

## 2023-02-08 PROCEDURE — 99213 OFFICE O/P EST LOW 20 MIN: CPT | Performed by: OPHTHALMOLOGY

## 2023-02-08 ASSESSMENT — REFRACTION_CURRENTRX
OS_ADD: +2.25
OS_OVR_VA: 20/
OD_OVR_VA: 20/
OD_CYLINDER: +2.00
OS_CYLINDER: +0.75
OD_ADD: +2.50
OD_SPHERE: -0.75
OS_SPHERE: +1.75
OD_AXIS: 10
OS_AXIS: 024
OD_SPHERE: -0.75
OS_AXIS: 175
OS_ADD: +2.50
OS_CYLINDER: +0.50
OD_CYLINDER: +2.00
OD_OVR_VA: 20/
OS_OVR_VA: 20/
OD_AXIS: 011
OS_SPHERE: +1.75
OD_ADD: +2.25

## 2023-02-08 ASSESSMENT — PACHYMETRY
OS_CT_CORRECTION: -1
OD_CT_CORRECTION: -1
OS_CT_UM: 554
OD_CT_UM: 557

## 2023-02-08 ASSESSMENT — LID EXAM ASSESSMENTS
OD_BLEPHARITIS: RUL 1+
OS_BLEPHARITIS: LUL 1+

## 2023-02-08 ASSESSMENT — CONFRONTATIONAL VISUAL FIELD TEST (CVF)
OD_FINDINGS: FULL
OS_FINDINGS: FULL

## 2023-02-08 ASSESSMENT — REFRACTION_MANIFEST
OD_SPHERE: -0.75
OD_AXIS: 180
OD_VA1: 20/25+
OS_SPHERE: +1.00
OS_CYLINDER: +1.50
OD_CYLINDER: +1.25
OS_VA1: 20/30
OS_AXIS: 180

## 2023-02-08 ASSESSMENT — SPHEQUIV_DERIVED
OS_SPHEQUIV: 2.75
OD_SPHEQUIV: -0.125
OS_SPHEQUIV: 1.75

## 2023-02-08 ASSESSMENT — TONOMETRY
OS_IOP_MMHG: 19
OD_IOP_MMHG: 17

## 2023-02-08 ASSESSMENT — REFRACTION_AUTOREFRACTION
OS_AXIS: 004
OD_SPHERE: --1.25
OS_CYLINDER: +1.50
OD_AXIS: 174
OS_SPHERE: +2.00
OD_CYLINDER: +1.25

## 2023-02-08 ASSESSMENT — CORNEAL DYSTROPHY - POSTERIOR
OS_POSTERIORDYSTROPHY: T GUTTATA
OD_POSTERIORDYSTROPHY: T GUTTATA

## 2023-02-08 ASSESSMENT — VISUAL ACUITY
OD_BCVA: 20/40-2
OS_BCVA: 20/40-2

## 2023-02-10 ENCOUNTER — NON-APPOINTMENT (OUTPATIENT)
Age: 78
End: 2023-02-10

## 2023-02-10 ENCOUNTER — APPOINTMENT (OUTPATIENT)
Dept: FAMILY MEDICINE | Facility: CLINIC | Age: 78
End: 2023-02-10
Payer: MEDICARE

## 2023-02-10 VITALS
WEIGHT: 130 LBS | BODY MASS INDEX: 22.2 KG/M2 | SYSTOLIC BLOOD PRESSURE: 130 MMHG | OXYGEN SATURATION: 96 % | RESPIRATION RATE: 16 BRPM | HEART RATE: 83 BPM | HEIGHT: 64 IN | TEMPERATURE: 96.8 F | DIASTOLIC BLOOD PRESSURE: 70 MMHG

## 2023-02-10 DIAGNOSIS — H40.1132 PRIMARY OPEN-ANGLE GLAUCOMA, BILATERAL, MODERATE STAGE: ICD-10-CM

## 2023-02-10 DIAGNOSIS — H25.9 UNSPECIFIED AGE-RELATED CATARACT: ICD-10-CM

## 2023-02-10 PROCEDURE — 36415 COLL VENOUS BLD VENIPUNCTURE: CPT

## 2023-02-10 PROCEDURE — 99215 OFFICE O/P EST HI 40 MIN: CPT | Mod: 25

## 2023-02-10 PROCEDURE — 93000 ELECTROCARDIOGRAM COMPLETE: CPT

## 2023-02-11 ENCOUNTER — NON-APPOINTMENT (OUTPATIENT)
Age: 78
End: 2023-02-11

## 2023-02-11 PROBLEM — H40.1132 PRIMARY OPEN-ANGLE GLAUCOMA, BILATERAL, MODERATE STAGE: Status: ACTIVE | Noted: 2017-05-24

## 2023-02-11 PROBLEM — H25.9 SENILE CATARACT OF LEFT EYE, UNSPECIFIED AGE-RELATED CATARACT TYPE: Status: ACTIVE | Noted: 2023-02-10

## 2023-02-11 LAB
ALBUMIN SERPL ELPH-MCNC: 3.9 G/DL
ALP BLD-CCNC: 73 U/L
ALT SERPL-CCNC: 15 U/L
ANION GAP SERPL CALC-SCNC: 10 MMOL/L
AST SERPL-CCNC: 19 U/L
BASOPHILS # BLD AUTO: 0.08 K/UL
BASOPHILS NFR BLD AUTO: 1.4 %
BILIRUB SERPL-MCNC: 0.2 MG/DL
BUN SERPL-MCNC: 19 MG/DL
CALCIUM SERPL-MCNC: 9.3 MG/DL
CHLORIDE SERPL-SCNC: 105 MMOL/L
CO2 SERPL-SCNC: 26 MMOL/L
CREAT SERPL-MCNC: 0.91 MG/DL
EGFR: 64 ML/MIN/1.73M2
EOSINOPHIL # BLD AUTO: 0.17 K/UL
EOSINOPHIL NFR BLD AUTO: 3 %
GLUCOSE SERPL-MCNC: 77 MG/DL
HCT VFR BLD CALC: 40.7 %
HGB BLD-MCNC: 12.8 G/DL
IMM GRANULOCYTES NFR BLD AUTO: 0.4 %
LYMPHOCYTES # BLD AUTO: 1.43 K/UL
LYMPHOCYTES NFR BLD AUTO: 25.2 %
MAN DIFF?: NORMAL
MCHC RBC-ENTMCNC: 29.2 PG
MCHC RBC-ENTMCNC: 31.4 GM/DL
MCV RBC AUTO: 92.7 FL
MONOCYTES # BLD AUTO: 0.69 K/UL
MONOCYTES NFR BLD AUTO: 12.2 %
NEUTROPHILS # BLD AUTO: 3.28 K/UL
NEUTROPHILS NFR BLD AUTO: 57.8 %
PLATELET # BLD AUTO: 187 K/UL
POTASSIUM SERPL-SCNC: 4.8 MMOL/L
PROT SERPL-MCNC: 6.8 G/DL
RBC # BLD: 4.39 M/UL
RBC # FLD: 15.1 %
SODIUM SERPL-SCNC: 142 MMOL/L
WBC # FLD AUTO: 5.67 K/UL

## 2023-02-11 NOTE — RESULTS/DATA
[] : results reviewed [de-identified] : Normal [de-identified] : Normal [de-identified] : Sinus rhythm, low voltage in limb leads.

## 2023-02-11 NOTE — REVIEW OF SYSTEMS
[Vision Problems] : vision problems [Fever] : no fever [Chills] : no chills [Fatigue] : no fatigue [Hot Flashes] : no hot flashes [Night Sweats] : no night sweats [Recent Change In Weight] : ~T no recent weight change [Discharge] : no discharge [Pain] : no pain [Redness] : no redness [Dryness] : no dryness  [Itching] : no itching [Earache] : no earache [Hearing Loss] : no hearing loss [Nosebleed] : no nosebleeds [Hoarseness] : no hoarseness [Nasal Discharge] : no nasal discharge [Sore Throat] : no sore throat [Postnasal Drip] : no postnasal drip [Chest Pain] : no chest pain [Palpitations] : no palpitations [Leg Claudication] : no leg claudication [Lower Ext Edema] : no lower extremity edema [Orthopnea] : no orthopnea [Paroxysmal Nocturnal Dyspnea] : no paroxysmal nocturnal dyspnea [Shortness Of Breath] : no shortness of breath [Wheezing] : no wheezing [Cough] : no cough [Dyspnea on Exertion] : no dyspnea on exertion [Abdominal Pain] : no abdominal pain [Nausea] : no nausea [Constipation] : no constipation [Diarrhea] : diarrhea [Vomiting] : no vomiting [Heartburn] : no heartburn [Melena] : no melena [Dysuria] : no dysuria [Incontinence] : no incontinence [Nocturia] : no nocturia [Hematuria] : no hematuria [Frequency] : no frequency [Vaginal Discharge] : no vaginal discharge [Joint Pain] : no joint pain [Joint Stiffness] : no joint stiffness [Joint Swelling] : no joint swelling [Muscle Weakness] : no muscle weakness [Muscle Pain] : no muscle pain [Back Pain] : no back pain [Itching] : no itching [Mole Changes] : no mole changes [Nail Changes] : no nail changes [Hair Changes] : no hair changes [Skin Rash] : no skin rash [Headache] : no headache [Dizziness] : no dizziness [Fainting] : no fainting [Confusion] : no confusion [Memory Loss] : no memory loss [Unsteady Walking] : no ataxia [Suicidal] : not suicidal [Insomnia] : no insomnia [Anxiety] : no anxiety [Depression] : no depression [Easy Bleeding] : no easy bleeding [Easy Bruising] : no easy bruising [Swollen Glands] : no swollen glands

## 2023-02-11 NOTE — HISTORY OF PRESENT ILLNESS
[Moderate (4-6 METs)] : Moderate (4-6 METs) [(Patient denies any chest pain, claudication, dyspnea on exertion, orthopnea, palpitations or syncope)] : Patient denies any chest pain, claudication, dyspnea on exertion, orthopnea, palpitations or syncope [Aortic Stenosis] : no aortic stenosis [Atrial Fibrillation] : no atrial fibrillation [Coronary Artery Disease] : no coronary artery disease [Recent Myocardial Infarction] : no recent myocardial infarction [Implantable Device/Pacemaker] : no implantable device/pacemaker [Asthma] : no asthma [COPD] : no COPD [Sleep Apnea] : no sleep apnea [Smoker] : not a smoker [Family Member] : no family member with adverse anesthesia reaction/sudden death [Self] : no previous adverse anesthesia reaction [Chronic Anticoagulation] : no chronic anticoagulation [Chronic Kidney Disease] : no chronic kidney disease [Diabetes] : no diabetes [FreeTextEntry1] : OS cataract extraction, IOL placement and goniotomy pr goniotomy procedure ocedure [FreeTextEntry2] : 02/23/2023 [FreeTextEntry3] : Jeremiah Washington MD [FreeTextEntry4] : 77-year-old  female presents for preoperative medical clearance-OS cataract extraction, IOL placement and goniotomy procedure-[facility]-02/23/2023-Jeremiah Mcmahan MD. History of progressive acuity deterioration over the last 2 years, accompanied by enlarging nuclear senile cataract. (+)OU glaucoma. Scheduled for the above elective procedure. No history of cardiovascular, pulmonary, hepatic, renal, or hematologic disease. Non-smoker.

## 2023-02-23 ENCOUNTER — AMBULATORY SURGERY CENTER (OUTPATIENT)
Dept: URBAN - METROPOLITAN AREA SURGERY 17 | Facility: SURGERY | Age: 78
Setting detail: OPHTHALMOLOGY
End: 2023-02-23
Payer: MEDICARE

## 2023-02-23 DIAGNOSIS — H25.12: ICD-10-CM

## 2023-02-23 DIAGNOSIS — H40.1121: ICD-10-CM

## 2023-02-23 PROCEDURE — 66984 XCAPSL CTRC RMVL W/O ECP: CPT | Performed by: OPHTHALMOLOGY

## 2023-02-23 PROCEDURE — 65820 GONIOTOMY: CPT | Performed by: OPHTHALMOLOGY

## 2023-02-24 ENCOUNTER — OFFICE (OUTPATIENT)
Dept: URBAN - METROPOLITAN AREA CLINIC 29 | Facility: CLINIC | Age: 78
Setting detail: OPHTHALMOLOGY
End: 2023-02-24
Payer: MEDICARE

## 2023-02-24 ENCOUNTER — RX ONLY (RX ONLY)
Age: 78
End: 2023-02-24

## 2023-02-24 DIAGNOSIS — H35.373: ICD-10-CM

## 2023-02-24 DIAGNOSIS — H01.004: ICD-10-CM

## 2023-02-24 DIAGNOSIS — H52.213: ICD-10-CM

## 2023-02-24 DIAGNOSIS — Z96.1: ICD-10-CM

## 2023-02-24 DIAGNOSIS — H40.1131: ICD-10-CM

## 2023-02-24 DIAGNOSIS — H01.001: ICD-10-CM

## 2023-02-24 PROCEDURE — 99024 POSTOP FOLLOW-UP VISIT: CPT | Performed by: OPHTHALMOLOGY

## 2023-02-24 ASSESSMENT — REFRACTION_AUTOREFRACTION
OS_CYLINDER: +1.50
OD_AXIS: 174
OD_CYLINDER: +1.25
OS_SPHERE: +2.00
OS_AXIS: 004
OD_SPHERE: --1.25

## 2023-02-24 ASSESSMENT — REFRACTION_MANIFEST
OD_AXIS: 180
OS_CYLINDER: +1.50
OS_AXIS: 180
OS_SPHERE: +1.00
OD_SPHERE: -0.75
OD_VA1: 20/25+
OS_VA1: 20/30
OD_CYLINDER: +1.25

## 2023-02-24 ASSESSMENT — SPHEQUIV_DERIVED
OS_SPHEQUIV: 2.75
OS_SPHEQUIV: 1.75
OD_SPHEQUIV: -0.125

## 2023-02-24 ASSESSMENT — LID EXAM ASSESSMENTS
OS_BLEPHARITIS: LUL 1+
OD_BLEPHARITIS: RUL 1+

## 2023-02-24 ASSESSMENT — REFRACTION_CURRENTRX
OS_OVR_VA: 20/
OS_ADD: +2.50
OS_CYLINDER: +0.50
OD_SPHERE: -0.75
OS_OVR_VA: 20/
OS_ADD: +2.25
OD_AXIS: 10
OD_OVR_VA: 20/
OD_CYLINDER: +2.00
OD_SPHERE: -0.75
OS_SPHERE: +1.75
OS_AXIS: 024
OS_SPHERE: +1.75
OD_CYLINDER: +2.00
OD_ADD: +2.25
OD_OVR_VA: 20/
OD_ADD: +2.50
OS_AXIS: 175
OD_AXIS: 011
OS_CYLINDER: +0.75

## 2023-02-24 ASSESSMENT — CORNEAL DYSTROPHY - POSTERIOR
OD_POSTERIORDYSTROPHY: T GUTTATA
OS_POSTERIORDYSTROPHY: T GUTTATA

## 2023-02-24 ASSESSMENT — VISUAL ACUITY
OS_BCVA: 20/50-2
OD_BCVA: 20/60

## 2023-02-24 ASSESSMENT — CONFRONTATIONAL VISUAL FIELD TEST (CVF)
OD_FINDINGS: FULL
OS_FINDINGS: FULL

## 2023-02-24 ASSESSMENT — CORNEAL EDEMA CLINICAL DESCRIPTION: OS_CORNEALEDEMA: T

## 2023-03-02 NOTE — PHYSICAL EXAM
Detail Level: Detailed Render In Strict Bullet Format?: No Initiate Treatment: EpiCeram topical emulsion, extended release \\nQuantity: 225.0 g  Days Supply: 30\\nSig: Apply to the affected areas of the face and body as needed for moisturizer Initiate Treatment: triamcinolone acetonide 0.1 % topical cream \\nQuantity: 80.0 g  Days Supply: 30\\nSig: Apply twice daily to itchy skin, arms, trunk, legs for up to 2 week as needed. Mix with moisturizer daily. Then restart PRN flares. [No Acute Distress] : no acute distress [Well Nourished] : well nourished [Well Developed] : well developed [Well-Appearing] : well-appearing [Normal Voice/Communication] : normal voice/communication [Normal Sclera/Conjunctiva] : normal sclera/conjunctiva [PERRL] : pupils equal round and reactive to light [EOMI] : extraocular movements intact [Fundoscopic Exam Performed] : fundoscopic ~T exam ~C was performed [Normal Outer Ear/Nose] : the outer ears and nose were normal in appearance [Normal Oropharynx] : the oropharynx was normal [Normal TMs] : both tympanic membranes were normal [Normal Nasal Mucosa] : the nasal mucosa was normal [No JVD] : no jugular venous distention [No Lymphadenopathy] : no lymphadenopathy [Supple] : supple [Thyroid Normal, No Nodules] : the thyroid was normal and there were no nodules present [No Respiratory Distress] : no respiratory distress  [No Accessory Muscle Use] : no accessory muscle use [Clear to Auscultation] : lungs were clear to auscultation bilaterally [Normal Percussion] : the chest was normal to percussion [Normal Rate] : normal rate  [Regular Rhythm] : with a regular rhythm [Normal S1, S2] : normal S1 and S2 [No Murmur] : no murmur heard [No Carotid Bruits] : no carotid bruits [No Abdominal Bruit] : a ~M bruit was not heard ~T in the abdomen [No Varicosities] : no varicosities [Pedal Pulses Present] : the pedal pulses are present [No Edema] : there was no peripheral edema [No Palpable Aorta] : no palpable aorta [No Extremity Clubbing/Cyanosis] : no extremity clubbing/cyanosis [Soft] : abdomen soft [Non Tender] : non-tender [Non-distended] : non-distended [No Masses] : no abdominal mass palpated [No HSM] : no HSM [Normal Bowel Sounds] : normal bowel sounds [Normal Supraclavicular Nodes] : no supraclavicular lymphadenopathy [Normal Axillary Nodes] : no axillary lymphadenopathy [Normal Posterior Cervical Nodes] : no posterior cervical lymphadenopathy [Normal Anterior Cervical Nodes] : no anterior cervical lymphadenopathy [Normal Inguinal Nodes] : no inguinal lymphadenopathy [Normal Femoral Nodes] : no femoral lymphadenopathy [No CVA Tenderness] : no CVA  tenderness [No Spinal Tenderness] : no spinal tenderness [No Joint Swelling] : no joint swelling [Grossly Normal Strength/Tone] : grossly normal strength/tone [No Rash] : no rash [No Skin Lesions] : no skin lesions [Coordination Grossly Intact] : coordination grossly intact [No Focal Deficits] : no focal deficits [Normal Gait] : normal gait [Deep Tendon Reflexes (DTR)] : deep tendon reflexes were 2+ and symmetric [Speech Grossly Normal] : speech grossly normal [Memory Grossly Normal] : memory grossly normal [Normal Affect] : the affect was normal [Alert and Oriented x3] : oriented to person, place, and time [Normal Mood] : the mood was normal [Normal Insight/Judgement] : insight and judgment were intact [Kyphosis] : no kyphosis [Scoliosis] : no scoliosis [Acne] : no acne [de-identified] : OD-IOL present, OS-(+)nuclear senile cataract

## 2023-03-08 ENCOUNTER — OFFICE (OUTPATIENT)
Dept: URBAN - METROPOLITAN AREA CLINIC 30 | Facility: CLINIC | Age: 78
Setting detail: OPHTHALMOLOGY
End: 2023-03-08
Payer: MEDICARE

## 2023-03-08 DIAGNOSIS — H01.001: ICD-10-CM

## 2023-03-08 DIAGNOSIS — H40.1131: ICD-10-CM

## 2023-03-08 DIAGNOSIS — H35.373: ICD-10-CM

## 2023-03-08 DIAGNOSIS — Z96.1: ICD-10-CM

## 2023-03-08 DIAGNOSIS — H52.213: ICD-10-CM

## 2023-03-08 DIAGNOSIS — H01.004: ICD-10-CM

## 2023-03-08 DIAGNOSIS — H16.222: ICD-10-CM

## 2023-03-08 PROCEDURE — 99024 POSTOP FOLLOW-UP VISIT: CPT | Performed by: OPHTHALMOLOGY

## 2023-03-08 ASSESSMENT — REFRACTION_AUTOREFRACTION
OD_CYLINDER: +1.75
OS_AXIS: 178
OD_AXIS: 178
OD_SPHERE: -1.00
OS_SPHERE: -0.50
OS_CYLINDER: +0.50

## 2023-03-08 ASSESSMENT — SPHEQUIV_DERIVED
OS_SPHEQUIV: -0.25
OD_SPHEQUIV: -0.125
OS_SPHEQUIV: -0.25
OS_SPHEQUIV: 1.75
OD_SPHEQUIV: -0.125
OD_SPHEQUIV: -0.125

## 2023-03-08 ASSESSMENT — REFRACTION_CURRENTRX
OS_OVR_VA: 20/
OD_CYLINDER: +2.00
OS_CYLINDER: +0.50
OS_OVR_VA: 20/
OS_ADD: +2.25
OS_CYLINDER: +0.75
OD_CYLINDER: +2.00
OS_SPHERE: +1.75
OD_OVR_VA: 20/
OD_SPHERE: -0.75
OD_SPHERE: -0.75
OD_ADD: +2.50
OD_AXIS: 10
OS_AXIS: 175
OD_ADD: +2.25
OS_AXIS: 024
OS_ADD: +2.50
OS_SPHERE: +1.75
OD_AXIS: 011
OD_OVR_VA: 20/

## 2023-03-08 ASSESSMENT — REFRACTION_MANIFEST
OS_VA1: 20/30
OD_SPHERE: -1.00
OS_AXIS: 178
OS_AXIS: 180
OD_VA1: 20/25
OD_CYLINDER: +1.25
OS_VA1: 20/30
OS_SPHERE: -0.50
OS_SPHERE: +1.00
OS_CYLINDER: +1.50
OD_SPHERE: -0.75
OD_CYLINDER: +1.75
OS_CYLINDER: +0.50
OD_AXIS: 180
OD_AXIS: 178
OD_VA1: 20/25+

## 2023-03-08 ASSESSMENT — PACHYMETRY
OS_CT_UM: 554
OD_CT_CORRECTION: -1
OS_CT_CORRECTION: -1
OD_CT_UM: 557

## 2023-03-08 ASSESSMENT — CORNEAL DYSTROPHY - POSTERIOR
OS_POSTERIORDYSTROPHY: T GUTTATA
OD_POSTERIORDYSTROPHY: T GUTTATA

## 2023-03-08 ASSESSMENT — SUPERFICIAL PUNCTATE KERATITIS (SPK): OS_SPK: 2+

## 2023-03-08 ASSESSMENT — CONFRONTATIONAL VISUAL FIELD TEST (CVF)
OS_FINDINGS: FULL
OD_FINDINGS: FULL

## 2023-03-08 ASSESSMENT — TONOMETRY
OD_IOP_MMHG: 19
OS_IOP_MMHG: 20

## 2023-03-08 ASSESSMENT — VISUAL ACUITY
OD_BCVA: 20/60-1
OS_BCVA: 20/60-2

## 2023-03-08 ASSESSMENT — LID EXAM ASSESSMENTS
OS_BLEPHARITIS: LUL 1+
OD_BLEPHARITIS: RUL 1+

## 2023-04-19 ENCOUNTER — RX ONLY (RX ONLY)
Age: 78
End: 2023-04-19

## 2023-04-19 ENCOUNTER — OFFICE (OUTPATIENT)
Dept: URBAN - METROPOLITAN AREA CLINIC 30 | Facility: CLINIC | Age: 78
Setting detail: OPHTHALMOLOGY
End: 2023-04-19
Payer: MEDICARE

## 2023-04-19 DIAGNOSIS — H52.4: ICD-10-CM

## 2023-04-19 DIAGNOSIS — H35.352: ICD-10-CM

## 2023-04-19 PROCEDURE — 99213 OFFICE O/P EST LOW 20 MIN: CPT | Performed by: OPHTHALMOLOGY

## 2023-04-19 PROCEDURE — 92134 CPTRZ OPH DX IMG PST SGM RTA: CPT | Performed by: OPHTHALMOLOGY

## 2023-04-19 PROCEDURE — 92015 DETERMINE REFRACTIVE STATE: CPT | Performed by: OPHTHALMOLOGY

## 2023-04-19 RX ORDER — KETOROLAC TROMETHAMINE 5 MG/ML
SOLUTION OPHTHALMIC
Qty: 5 | Refills: 1 | Status: ACTIVE | OUTPATIENT

## 2023-04-19 RX ORDER — PREDNISOLONE ACETATE 10 MG/ML
SUSPENSION/ DROPS OPHTHALMIC
Qty: 5 | Refills: 6 | Status: ACTIVE | OUTPATIENT

## 2023-04-19 ASSESSMENT — CORNEAL DYSTROPHY - POSTERIOR
OS_POSTERIORDYSTROPHY: T GUTTATA
OD_POSTERIORDYSTROPHY: T GUTTATA

## 2023-04-19 ASSESSMENT — SUPERFICIAL PUNCTATE KERATITIS (SPK): OS_SPK: 2+

## 2023-04-19 ASSESSMENT — REFRACTION_MANIFEST
OD_SPHERE: -1.00
OS_CYLINDER: +0.50
OS_ADD: +2.50
OD_VA1: 20/25+
OS_VA1: 20/30
OD_AXIS: 180
OD_CYLINDER: +1.50
OS_SPHERE: -0.25
OS_AXIS: 180
OS_AXIS: 180
OD_ADD: +2.50
OS_VA1: 20/30-2
OS_CYLINDER: +1.50
OS_SPHERE: +1.00
OD_VA1: 20/25
OD_CYLINDER: +1.25
OD_AXIS: 180
OD_SPHERE: -0.75
OU_VA: 20/20

## 2023-04-19 ASSESSMENT — VISUAL ACUITY
OD_BCVA: 20/60-1
OS_BCVA: 20/50-1

## 2023-04-19 ASSESSMENT — REFRACTION_CURRENTRX
OD_ADD: +2.50
OS_SPHERE: +1.75
OS_ADD: +2.50
OD_OVR_VA: 20/
OS_ADD: +2.25
OD_OVR_VA: 20/
OD_CYLINDER: +2.00
OS_OVR_VA: 20/
OD_AXIS: 011
OS_CYLINDER: +0.50
OS_AXIS: 175
OS_AXIS: 024
OS_CYLINDER: +0.75
OD_AXIS: 10
OD_SPHERE: -0.75
OS_SPHERE: +1.75
OD_SPHERE: -0.75
OS_OVR_VA: 20/
OD_ADD: +2.25
OD_CYLINDER: +2.00

## 2023-04-19 ASSESSMENT — SPHEQUIV_DERIVED
OD_SPHEQUIV: -0.125
OD_SPHEQUIV: 0.125
OS_SPHEQUIV: 0.125
OS_SPHEQUIV: 0
OS_SPHEQUIV: 1.75
OD_SPHEQUIV: -0.25

## 2023-04-19 ASSESSMENT — CONFRONTATIONAL VISUAL FIELD TEST (CVF)
OD_FINDINGS: FULL
OS_FINDINGS: FULL

## 2023-04-19 ASSESSMENT — REFRACTION_AUTOREFRACTION
OS_AXIS: 010
OS_SPHERE: -0.25
OD_AXIS: 180
OD_SPHERE: -0.75
OD_CYLINDER: +1.75
OS_CYLINDER: +0.75

## 2023-04-19 ASSESSMENT — LID EXAM ASSESSMENTS
OD_BLEPHARITIS: RUL 1+
OS_BLEPHARITIS: LUL 1+

## 2023-04-19 ASSESSMENT — PACHYMETRY
OD_CT_CORRECTION: -1
OD_CT_UM: 557
OS_CT_CORRECTION: -1
OS_CT_UM: 554

## 2023-04-19 ASSESSMENT — TONOMETRY
OD_IOP_MMHG: 18
OS_IOP_MMHG: 16

## 2023-05-05 ENCOUNTER — APPOINTMENT (OUTPATIENT)
Dept: INTERNAL MEDICINE | Facility: CLINIC | Age: 78
End: 2023-05-05

## 2023-05-24 ENCOUNTER — OFFICE (OUTPATIENT)
Dept: URBAN - METROPOLITAN AREA CLINIC 30 | Facility: CLINIC | Age: 78
Setting detail: OPHTHALMOLOGY
End: 2023-05-24
Payer: MEDICARE

## 2023-05-24 ENCOUNTER — RX ONLY (RX ONLY)
Age: 78
End: 2023-05-24

## 2023-05-24 DIAGNOSIS — H40.1131: ICD-10-CM

## 2023-05-24 DIAGNOSIS — H52.213: ICD-10-CM

## 2023-05-24 DIAGNOSIS — Z96.1: ICD-10-CM

## 2023-05-24 DIAGNOSIS — H16.222: ICD-10-CM

## 2023-05-24 DIAGNOSIS — H01.001: ICD-10-CM

## 2023-05-24 DIAGNOSIS — H35.373: ICD-10-CM

## 2023-05-24 DIAGNOSIS — H01.004: ICD-10-CM

## 2023-05-24 DIAGNOSIS — H35.352: ICD-10-CM

## 2023-05-24 PROCEDURE — 92134 CPTRZ OPH DX IMG PST SGM RTA: CPT | Performed by: OPHTHALMOLOGY

## 2023-05-24 PROCEDURE — 99213 OFFICE O/P EST LOW 20 MIN: CPT | Performed by: OPHTHALMOLOGY

## 2023-05-24 ASSESSMENT — REFRACTION_CURRENTRX
OS_CYLINDER: +0.25
OD_SPHERE: -0.75
OD_OVR_VA: 20/
OS_AXIS: 024
OD_CYLINDER: +2.00
OS_AXIS: 178
OD_AXIS: 011
OD_CYLINDER: +1.50
OS_CYLINDER: +0.75
OS_ADD: +2.50
OS_ADD: +2.75
OD_OVR_VA: 20/
OD_ADD: +2.75
OD_AXIS: 177
OS_OVR_VA: 20/
OD_ADD: +2.50
OS_SPHERE: PLANO
OS_OVR_VA: 20/
OD_SPHERE: -1.00
OS_SPHERE: +1.75

## 2023-05-24 ASSESSMENT — REFRACTION_MANIFEST
OD_AXIS: 180
OD_SPHERE: -0.75
OD_CYLINDER: +1.50
OD_SPHERE: -1.00
OU_VA: 20/20
OS_AXIS: 180
OS_ADD: +2.50
OS_VA1: 20/30
OD_ADD: +2.50
OS_CYLINDER: +0.50
OD_VA1: 20/25
OS_CYLINDER: +1.50
OD_AXIS: 180
OS_VA1: 20/30-2
OS_SPHERE: -0.25
OD_VA1: 20/25+
OS_AXIS: 180
OD_CYLINDER: +1.25
OS_SPHERE: +1.00

## 2023-05-24 ASSESSMENT — PACHYMETRY
OD_CT_CORRECTION: -1
OS_CT_UM: 554
OD_CT_UM: 557
OS_CT_CORRECTION: -1

## 2023-05-24 ASSESSMENT — VISUAL ACUITY
OS_BCVA: 20/25+2
OD_BCVA: 20/50+2

## 2023-05-24 ASSESSMENT — SPHEQUIV_DERIVED
OD_SPHEQUIV: 0.125
OD_SPHEQUIV: -0.25
OS_SPHEQUIV: 1.75
OS_SPHEQUIV: 0
OD_SPHEQUIV: -0.125
OS_SPHEQUIV: 0.125

## 2023-05-24 ASSESSMENT — CORNEAL DYSTROPHY - POSTERIOR
OS_POSTERIORDYSTROPHY: T GUTTATA
OD_POSTERIORDYSTROPHY: T GUTTATA

## 2023-05-24 ASSESSMENT — REFRACTION_AUTOREFRACTION
OD_AXIS: 180
OS_CYLINDER: +0.75
OD_SPHERE: -0.75
OS_AXIS: 010
OS_SPHERE: -0.25
OD_CYLINDER: +1.75

## 2023-05-24 ASSESSMENT — TONOMETRY
OD_IOP_MMHG: 18
OS_IOP_MMHG: 16

## 2023-05-24 ASSESSMENT — LID EXAM ASSESSMENTS
OS_BLEPHARITIS: LUL 1+
OD_BLEPHARITIS: RUL 1+

## 2023-05-24 ASSESSMENT — SUPERFICIAL PUNCTATE KERATITIS (SPK): OS_SPK: 2+

## 2023-06-21 ENCOUNTER — OFFICE (OUTPATIENT)
Dept: URBAN - METROPOLITAN AREA CLINIC 30 | Facility: CLINIC | Age: 78
Setting detail: OPHTHALMOLOGY
End: 2023-06-21
Payer: MEDICARE

## 2023-06-21 DIAGNOSIS — H40.1131: ICD-10-CM

## 2023-06-21 DIAGNOSIS — Z96.1: ICD-10-CM

## 2023-06-21 DIAGNOSIS — H01.001: ICD-10-CM

## 2023-06-21 DIAGNOSIS — H52.213: ICD-10-CM

## 2023-06-21 DIAGNOSIS — H01.004: ICD-10-CM

## 2023-06-21 DIAGNOSIS — H16.222: ICD-10-CM

## 2023-06-21 DIAGNOSIS — H35.352: ICD-10-CM

## 2023-06-21 DIAGNOSIS — H35.373: ICD-10-CM

## 2023-06-21 PROCEDURE — 99213 OFFICE O/P EST LOW 20 MIN: CPT | Performed by: OPHTHALMOLOGY

## 2023-06-21 PROCEDURE — 92134 CPTRZ OPH DX IMG PST SGM RTA: CPT | Performed by: OPHTHALMOLOGY

## 2023-06-21 ASSESSMENT — PACHYMETRY
OS_CT_UM: 554
OD_CT_UM: 557
OS_CT_CORRECTION: -1
OD_CT_CORRECTION: -1

## 2023-06-21 ASSESSMENT — REFRACTION_CURRENTRX
OD_ADD: +2.50
OS_OVR_VA: 20/
OS_ADD: +2.75
OD_CYLINDER: +1.50
OS_OVR_VA: 20/
OD_AXIS: 177
OS_AXIS: 178
OS_CYLINDER: +0.75
OD_OVR_VA: 20/
OD_CYLINDER: +2.00
OD_ADD: +2.75
OD_OVR_VA: 20/
OD_SPHERE: -0.75
OD_AXIS: 011
OS_SPHERE: PLANO
OS_AXIS: 024
OS_CYLINDER: +0.25
OD_SPHERE: -1.00
OS_SPHERE: +1.75
OS_ADD: +2.50

## 2023-06-21 ASSESSMENT — REFRACTION_MANIFEST
OS_SPHERE: +1.00
OS_SPHERE: -0.25
OS_VA1: 20/30-2
OD_CYLINDER: +1.50
OD_VA1: 20/25+
OD_AXIS: 180
OD_VA1: 20/25
OD_SPHERE: -0.75
OD_ADD: +2.50
OD_CYLINDER: +1.25
OS_AXIS: 180
OS_CYLINDER: +0.50
OU_VA: 20/20
OS_CYLINDER: +1.50
OS_VA1: 20/30
OS_AXIS: 180
OD_SPHERE: -1.00
OD_AXIS: 180
OS_ADD: +2.50

## 2023-06-21 ASSESSMENT — CONFRONTATIONAL VISUAL FIELD TEST (CVF)
OS_FINDINGS: FULL
OD_FINDINGS: FULL

## 2023-06-21 ASSESSMENT — SPHEQUIV_DERIVED
OD_SPHEQUIV: -0.25
OD_SPHEQUIV: -0.125
OS_SPHEQUIV: 1.75
OD_SPHEQUIV: 0.125
OS_SPHEQUIV: 0
OS_SPHEQUIV: 0.125

## 2023-06-21 ASSESSMENT — REFRACTION_AUTOREFRACTION
OD_CYLINDER: +1.75
OD_SPHERE: -0.75
OS_SPHERE: -0.25
OS_AXIS: 010
OS_CYLINDER: +0.75
OD_AXIS: 180

## 2023-06-21 ASSESSMENT — SUPERFICIAL PUNCTATE KERATITIS (SPK): OS_SPK: 2+

## 2023-06-21 ASSESSMENT — TONOMETRY
OD_IOP_MMHG: 18
OS_IOP_MMHG: 18

## 2023-06-21 ASSESSMENT — VISUAL ACUITY
OS_BCVA: 20/25+2
OD_BCVA: 20/60-2

## 2023-06-21 ASSESSMENT — CORNEAL DYSTROPHY - POSTERIOR
OD_POSTERIORDYSTROPHY: T GUTTATA
OS_POSTERIORDYSTROPHY: T GUTTATA

## 2023-06-21 ASSESSMENT — LID EXAM ASSESSMENTS
OS_BLEPHARITIS: LUL 1+
OD_BLEPHARITIS: RUL 1+

## 2023-07-19 ENCOUNTER — OFFICE (OUTPATIENT)
Dept: URBAN - METROPOLITAN AREA CLINIC 30 | Facility: CLINIC | Age: 78
Setting detail: OPHTHALMOLOGY
End: 2023-07-19
Payer: MEDICARE

## 2023-07-19 DIAGNOSIS — H01.001: ICD-10-CM

## 2023-07-19 DIAGNOSIS — H35.373: ICD-10-CM

## 2023-07-19 DIAGNOSIS — H40.1131: ICD-10-CM

## 2023-07-19 DIAGNOSIS — H52.213: ICD-10-CM

## 2023-07-19 DIAGNOSIS — H16.222: ICD-10-CM

## 2023-07-19 DIAGNOSIS — H01.004: ICD-10-CM

## 2023-07-19 DIAGNOSIS — Z96.1: ICD-10-CM

## 2023-07-19 DIAGNOSIS — H35.352: ICD-10-CM

## 2023-07-19 PROCEDURE — 92134 CPTRZ OPH DX IMG PST SGM RTA: CPT | Performed by: OPHTHALMOLOGY

## 2023-07-19 PROCEDURE — 99213 OFFICE O/P EST LOW 20 MIN: CPT | Performed by: OPHTHALMOLOGY

## 2023-07-19 ASSESSMENT — VISUAL ACUITY
OD_BCVA: 20/40
OS_BCVA: 20/20+2

## 2023-07-19 ASSESSMENT — REFRACTION_MANIFEST
OD_SPHERE: -1.00
OS_SPHERE: -0.25
OD_VA1: 20/25
OS_CYLINDER: +0.50
OD_AXIS: 180
OS_AXIS: 180
OD_ADD: +2.50
OD_SPHERE: -0.75
OS_ADD: +2.50
OS_SPHERE: +1.00
OS_VA1: 20/30-2
OD_AXIS: 180
OD_CYLINDER: +1.50
OS_VA1: 20/30
OS_AXIS: 180
OS_CYLINDER: +1.50
OD_VA1: 20/25+
OU_VA: 20/20
OD_CYLINDER: +1.25

## 2023-07-19 ASSESSMENT — SPHEQUIV_DERIVED
OS_SPHEQUIV: 0
OS_SPHEQUIV: 1.75
OD_SPHEQUIV: -0.25
OS_SPHEQUIV: 0.125
OD_SPHEQUIV: -0.125
OD_SPHEQUIV: 0.125

## 2023-07-19 ASSESSMENT — PACHYMETRY
OS_CT_CORRECTION: -1
OS_CT_UM: 554
OD_CT_CORRECTION: -1
OD_CT_UM: 557

## 2023-07-19 ASSESSMENT — REFRACTION_CURRENTRX
OS_AXIS: 178
OD_SPHERE: -0.75
OS_SPHERE: PLANO
OD_AXIS: 011
OD_CYLINDER: +1.50
OS_AXIS: 024
OS_ADD: +2.50
OD_ADD: +2.50
OS_CYLINDER: +0.75
OS_CYLINDER: +0.25
OS_OVR_VA: 20/
OD_OVR_VA: 20/
OS_OVR_VA: 20/
OS_SPHERE: +1.75
OS_ADD: +2.75
OD_OVR_VA: 20/
OD_AXIS: 177
OD_SPHERE: -1.00
OD_CYLINDER: +2.00
OD_ADD: +2.75

## 2023-07-19 ASSESSMENT — TONOMETRY
OD_IOP_MMHG: 20
OS_IOP_MMHG: 20

## 2023-07-19 ASSESSMENT — REFRACTION_AUTOREFRACTION
OD_CYLINDER: +1.75
OD_AXIS: 180
OS_CYLINDER: +0.75
OD_SPHERE: -0.75
OS_SPHERE: -0.25
OS_AXIS: 010

## 2023-07-19 ASSESSMENT — CORNEAL DYSTROPHY - POSTERIOR
OS_POSTERIORDYSTROPHY: T GUTTATA
OD_POSTERIORDYSTROPHY: T GUTTATA

## 2023-07-19 ASSESSMENT — CONFRONTATIONAL VISUAL FIELD TEST (CVF)
OD_FINDINGS: FULL
OS_FINDINGS: FULL

## 2023-07-19 ASSESSMENT — LID EXAM ASSESSMENTS
OS_BLEPHARITIS: LUL 1+
OD_BLEPHARITIS: RUL 1+

## 2023-07-19 ASSESSMENT — SUPERFICIAL PUNCTATE KERATITIS (SPK): OS_SPK: 2+

## 2023-07-27 ENCOUNTER — OFFICE (OUTPATIENT)
Dept: URBAN - METROPOLITAN AREA CLINIC 121 | Facility: CLINIC | Age: 78
Setting detail: OPHTHALMOLOGY
End: 2023-07-27
Payer: MEDICARE

## 2023-07-27 DIAGNOSIS — H35.373: ICD-10-CM

## 2023-07-27 DIAGNOSIS — H01.004: ICD-10-CM

## 2023-07-27 DIAGNOSIS — H01.001: ICD-10-CM

## 2023-07-27 DIAGNOSIS — Z96.1: ICD-10-CM

## 2023-07-27 DIAGNOSIS — H52.213: ICD-10-CM

## 2023-07-27 DIAGNOSIS — H40.1131: ICD-10-CM

## 2023-07-27 DIAGNOSIS — H35.352: ICD-10-CM

## 2023-07-27 DIAGNOSIS — H16.222: ICD-10-CM

## 2023-07-27 PROCEDURE — 67028 INJECTION EYE DRUG: CPT | Performed by: OPHTHALMOLOGY

## 2023-07-27 PROCEDURE — 92014 COMPRE OPH EXAM EST PT 1/>: CPT | Performed by: OPHTHALMOLOGY

## 2023-07-27 ASSESSMENT — CORNEAL DYSTROPHY - POSTERIOR
OD_POSTERIORDYSTROPHY: T GUTTATA
OS_POSTERIORDYSTROPHY: T GUTTATA

## 2023-07-27 ASSESSMENT — REFRACTION_MANIFEST
OS_SPHERE: -0.25
OS_AXIS: 180
OS_CYLINDER: +0.50
OS_VA1: 20/30
OD_CYLINDER: +1.25
OD_SPHERE: -1.00
OD_VA1: 20/25+
OS_CYLINDER: +1.50
OD_VA1: 20/25
OS_ADD: +2.50
OD_AXIS: 180
OS_SPHERE: +1.00
OD_CYLINDER: +1.50
OS_VA1: 20/30-2
OD_ADD: +2.50
OD_AXIS: 180
OU_VA: 20/20
OD_SPHERE: -0.75
OS_AXIS: 180

## 2023-07-27 ASSESSMENT — REFRACTION_AUTOREFRACTION
OD_SPHERE: -0.75
OD_AXIS: 180
OS_AXIS: 010
OD_CYLINDER: +1.75
OS_CYLINDER: +0.75
OS_SPHERE: -0.25

## 2023-07-27 ASSESSMENT — REFRACTION_CURRENTRX
OD_CYLINDER: +2.00
OS_OVR_VA: 20/
OS_CYLINDER: +0.25
OD_AXIS: 177
OD_ADD: +2.50
OD_CYLINDER: +1.50
OS_CYLINDER: +0.75
OD_OVR_VA: 20/
OS_ADD: +2.50
OS_SPHERE: PLANO
OD_OVR_VA: 20/
OD_AXIS: 011
OD_ADD: +2.75
OD_SPHERE: -1.00
OS_SPHERE: +1.75
OS_OVR_VA: 20/
OS_AXIS: 178
OD_SPHERE: -0.75
OS_ADD: +2.75
OS_AXIS: 024

## 2023-07-27 ASSESSMENT — LID EXAM ASSESSMENTS
OD_BLEPHARITIS: RUL 1+
OS_BLEPHARITIS: LUL 1+

## 2023-07-27 ASSESSMENT — PACHYMETRY
OS_CT_CORRECTION: -1
OD_CT_CORRECTION: -1
OS_CT_UM: 554
OD_CT_UM: 557

## 2023-07-27 ASSESSMENT — TONOMETRY
OS_IOP_MMHG: 17
OD_IOP_MMHG: 16

## 2023-07-27 ASSESSMENT — SPHEQUIV_DERIVED
OS_SPHEQUIV: 1.75
OD_SPHEQUIV: 0.125
OS_SPHEQUIV: 0
OD_SPHEQUIV: -0.25
OS_SPHEQUIV: 0.125
OD_SPHEQUIV: -0.125

## 2023-07-27 ASSESSMENT — VISUAL ACUITY
OD_BCVA: 20/50
OS_BCVA: 20/30+2

## 2023-07-27 ASSESSMENT — SUPERFICIAL PUNCTATE KERATITIS (SPK): OS_SPK: 2+

## 2023-07-27 ASSESSMENT — CONFRONTATIONAL VISUAL FIELD TEST (CVF)
OD_FINDINGS: FULL
OS_FINDINGS: FULL

## 2023-09-28 ENCOUNTER — OFFICE (OUTPATIENT)
Dept: URBAN - METROPOLITAN AREA CLINIC 121 | Facility: CLINIC | Age: 78
Setting detail: OPHTHALMOLOGY
End: 2023-09-28
Payer: MEDICARE

## 2023-09-28 DIAGNOSIS — H16.222: ICD-10-CM

## 2023-09-28 DIAGNOSIS — Z96.1: ICD-10-CM

## 2023-09-28 DIAGNOSIS — H01.001: ICD-10-CM

## 2023-09-28 DIAGNOSIS — H40.1131: ICD-10-CM

## 2023-09-28 DIAGNOSIS — H01.004: ICD-10-CM

## 2023-09-28 DIAGNOSIS — H35.352: ICD-10-CM

## 2023-09-28 DIAGNOSIS — H35.373: ICD-10-CM

## 2023-09-28 PROCEDURE — 92134 CPTRZ OPH DX IMG PST SGM RTA: CPT | Performed by: OPHTHALMOLOGY

## 2023-09-28 PROCEDURE — 92012 INTRM OPH EXAM EST PATIENT: CPT | Performed by: OPHTHALMOLOGY

## 2023-09-28 ASSESSMENT — SPHEQUIV_DERIVED
OS_SPHEQUIV: 0.125
OD_SPHEQUIV: 0.125
OD_SPHEQUIV: -0.25
OS_SPHEQUIV: 0
OD_SPHEQUIV: -0.125
OS_SPHEQUIV: 1.75

## 2023-09-28 ASSESSMENT — REFRACTION_CURRENTRX
OD_OVR_VA: 20/
OS_CYLINDER: +0.75
OD_SPHERE: -1.00
OS_AXIS: 024
OS_ADD: +2.75
OD_ADD: +2.50
OD_ADD: +2.75
OD_AXIS: 011
OS_ADD: +2.50
OD_CYLINDER: +1.50
OS_CYLINDER: +0.25
OS_OVR_VA: 20/
OS_AXIS: 178
OD_OVR_VA: 20/
OD_AXIS: 177
OS_SPHERE: +1.75
OS_SPHERE: PLANO
OD_CYLINDER: +2.00
OS_OVR_VA: 20/
OD_SPHERE: -0.75

## 2023-09-28 ASSESSMENT — REFRACTION_AUTOREFRACTION
OS_CYLINDER: +0.75
OD_SPHERE: -0.75
OD_AXIS: 180
OD_CYLINDER: +1.75
OS_AXIS: 010
OS_SPHERE: -0.25

## 2023-09-28 ASSESSMENT — REFRACTION_MANIFEST
OD_SPHERE: -0.75
OD_CYLINDER: +1.50
OS_SPHERE: -0.25
OU_VA: 20/20
OS_AXIS: 180
OD_VA1: 20/25
OD_ADD: +2.50
OS_AXIS: 180
OS_ADD: +2.50
OD_AXIS: 180
OS_VA1: 20/30-2
OS_VA1: 20/30
OD_AXIS: 180
OS_CYLINDER: +1.50
OS_CYLINDER: +0.50
OD_VA1: 20/25+
OS_SPHERE: +1.00
OD_CYLINDER: +1.25
OD_SPHERE: -1.00

## 2023-09-28 ASSESSMENT — PACHYMETRY
OS_CT_UM: 554
OS_CT_CORRECTION: -1
OD_CT_UM: 557
OD_CT_CORRECTION: -1

## 2023-09-28 ASSESSMENT — LID EXAM ASSESSMENTS
OS_BLEPHARITIS: LUL 1+
OD_BLEPHARITIS: RUL 1+

## 2023-09-28 ASSESSMENT — CONFRONTATIONAL VISUAL FIELD TEST (CVF)
OD_FINDINGS: FULL
OS_FINDINGS: FULL

## 2023-09-28 ASSESSMENT — VISUAL ACUITY
OD_BCVA: 20/30
OS_BCVA: 20/25

## 2023-09-28 ASSESSMENT — CORNEAL DYSTROPHY - POSTERIOR
OS_POSTERIORDYSTROPHY: T GUTTATA
OD_POSTERIORDYSTROPHY: T GUTTATA

## 2023-09-28 ASSESSMENT — SUPERFICIAL PUNCTATE KERATITIS (SPK): OS_SPK: 2+

## 2023-09-28 ASSESSMENT — TONOMETRY
OS_IOP_MMHG: 21
OD_IOP_MMHG: 19

## 2023-11-19 ENCOUNTER — NON-APPOINTMENT (OUTPATIENT)
Age: 78
End: 2023-11-19

## 2023-11-19 ENCOUNTER — APPOINTMENT (OUTPATIENT)
Dept: FAMILY MEDICINE | Facility: CLINIC | Age: 78
End: 2023-11-19
Payer: MEDICARE

## 2023-11-19 VITALS
HEIGHT: 64 IN | TEMPERATURE: 98.2 F | BODY MASS INDEX: 22.61 KG/M2 | OXYGEN SATURATION: 97 % | DIASTOLIC BLOOD PRESSURE: 80 MMHG | SYSTOLIC BLOOD PRESSURE: 132 MMHG | WEIGHT: 132.4 LBS | HEART RATE: 73 BPM | RESPIRATION RATE: 16 BRPM

## 2023-11-19 DIAGNOSIS — Z85.3 PERSONAL HISTORY OF MALIGNANT NEOPLASM OF BREAST: ICD-10-CM

## 2023-11-19 DIAGNOSIS — Z01.810 ENCOUNTER FOR PREPROCEDURAL CARDIOVASCULAR EXAMINATION: ICD-10-CM

## 2023-11-19 DIAGNOSIS — N64.89 OTHER SPECIFIED DISORDERS OF BREAST: ICD-10-CM

## 2023-11-19 DIAGNOSIS — Z01.818 ENCOUNTER FOR OTHER PREPROCEDURAL EXAMINATION: ICD-10-CM

## 2023-11-19 PROCEDURE — 93000 ELECTROCARDIOGRAM COMPLETE: CPT

## 2023-11-19 PROCEDURE — 36415 COLL VENOUS BLD VENIPUNCTURE: CPT

## 2023-11-19 PROCEDURE — 99215 OFFICE O/P EST HI 40 MIN: CPT | Mod: 25

## 2023-11-20 LAB
ANION GAP SERPL CALC-SCNC: 11 MMOL/L
APTT BLD: 26.9 SEC
BUN SERPL-MCNC: 19 MG/DL
CALCIUM SERPL-MCNC: 9.1 MG/DL
CHLORIDE SERPL-SCNC: 107 MMOL/L
CO2 SERPL-SCNC: 25 MMOL/L
CREAT SERPL-MCNC: 0.86 MG/DL
EGFR: 68 ML/MIN/1.73M2
GLUCOSE SERPL-MCNC: 74 MG/DL
HCT VFR BLD CALC: 42 %
HGB BLD-MCNC: 13.1 G/DL
INR PPP: 0.91 RATIO
MCHC RBC-ENTMCNC: 29.3 PG
MCHC RBC-ENTMCNC: 31.2 GM/DL
MCV RBC AUTO: 94 FL
PLATELET # BLD AUTO: 170 K/UL
POTASSIUM SERPL-SCNC: 4.1 MMOL/L
PT BLD: 10.4 SEC
RBC # BLD: 4.47 M/UL
RBC # FLD: 14.6 %
SODIUM SERPL-SCNC: 143 MMOL/L
WBC # FLD AUTO: 6.83 K/UL

## 2023-11-22 PROBLEM — Z85.3 HISTORY OF MALIGNANT NEOPLASM OF LEFT FEMALE BREAST, UNSPECIFIED ESTROGEN RECEPTOR STATUS, UNSPECIFIED SITE OF BREAST: Status: RESOLVED | Noted: 2023-11-22 | Resolved: 2023-11-22

## 2023-11-22 PROBLEM — N64.89: Status: ACTIVE | Noted: 2023-11-19

## 2023-11-22 PROBLEM — Z01.818 PREOPERATIVE CLEARANCE: Status: ACTIVE | Noted: 2020-03-06

## 2023-11-22 PROBLEM — Z01.810 PREOPERATIVE CARDIOVASCULAR EXAMINATION: Status: ACTIVE | Noted: 2020-03-06

## 2023-11-29 ENCOUNTER — OFFICE (OUTPATIENT)
Dept: URBAN - METROPOLITAN AREA CLINIC 30 | Facility: CLINIC | Age: 78
Setting detail: OPHTHALMOLOGY
End: 2023-11-29
Payer: MEDICARE

## 2023-11-29 DIAGNOSIS — Z96.1: ICD-10-CM

## 2023-11-29 DIAGNOSIS — H01.001: ICD-10-CM

## 2023-11-29 DIAGNOSIS — H52.213: ICD-10-CM

## 2023-11-29 DIAGNOSIS — H01.004: ICD-10-CM

## 2023-11-29 DIAGNOSIS — H16.222: ICD-10-CM

## 2023-11-29 DIAGNOSIS — H35.352: ICD-10-CM

## 2023-11-29 DIAGNOSIS — H35.373: ICD-10-CM

## 2023-11-29 DIAGNOSIS — H40.1131: ICD-10-CM

## 2023-11-29 PROCEDURE — 99214 OFFICE O/P EST MOD 30 MIN: CPT | Performed by: OPHTHALMOLOGY

## 2023-11-29 PROCEDURE — 92134 CPTRZ OPH DX IMG PST SGM RTA: CPT | Performed by: OPHTHALMOLOGY

## 2023-11-29 ASSESSMENT — REFRACTION_CURRENTRX
OS_OVR_VA: 20/
OS_OVR_VA: 20/
OD_OVR_VA: 20/
OS_ADD: +2.75
OD_SPHERE: -0.75
OS_SPHERE: PLANO
OD_AXIS: 011
OS_ADD: +2.50
OD_CYLINDER: +1.50
OD_SPHERE: -1.00
OD_ADD: +2.75
OS_SPHERE: +1.75
OS_CYLINDER: +0.25
OS_AXIS: 024
OD_CYLINDER: +2.00
OS_CYLINDER: +0.75
OS_AXIS: 178
OD_ADD: +2.50
OD_AXIS: 177
OD_OVR_VA: 20/

## 2023-11-29 ASSESSMENT — SPHEQUIV_DERIVED
OS_SPHEQUIV: 1.75
OS_SPHEQUIV: 0.125
OD_SPHEQUIV: 0.125
OD_SPHEQUIV: -0.125
OS_SPHEQUIV: 0
OD_SPHEQUIV: -0.25

## 2023-11-29 ASSESSMENT — REFRACTION_AUTOREFRACTION
OS_SPHERE: -0.25
OS_AXIS: 010
OS_CYLINDER: +0.75
OD_SPHERE: -0.75
OD_CYLINDER: +1.75
OD_AXIS: 180

## 2023-11-29 ASSESSMENT — SUPERFICIAL PUNCTATE KERATITIS (SPK): OS_SPK: 2+

## 2023-11-29 ASSESSMENT — REFRACTION_MANIFEST
OD_CYLINDER: +1.25
OS_CYLINDER: +0.50
OD_SPHERE: -0.75
OS_CYLINDER: +1.50
OD_ADD: +2.50
OS_SPHERE: +1.00
OD_VA1: 20/25+
OD_CYLINDER: +1.50
OU_VA: 20/20
OS_ADD: +2.50
OS_VA1: 20/30
OS_AXIS: 180
OD_AXIS: 180
OS_VA1: 20/30-2
OD_AXIS: 180
OD_SPHERE: -1.00
OS_SPHERE: -0.25
OS_AXIS: 180
OD_VA1: 20/25

## 2023-11-29 ASSESSMENT — LID EXAM ASSESSMENTS
OS_BLEPHARITIS: LUL 1+
OD_BLEPHARITIS: RUL 1+

## 2023-11-29 ASSESSMENT — CORNEAL DYSTROPHY - POSTERIOR
OS_POSTERIORDYSTROPHY: T GUTTATA
OD_POSTERIORDYSTROPHY: T GUTTATA

## 2023-11-29 ASSESSMENT — CONFRONTATIONAL VISUAL FIELD TEST (CVF)
OD_FINDINGS: FULL
OS_FINDINGS: FULL

## 2023-12-13 ENCOUNTER — RX ONLY (RX ONLY)
Age: 78
End: 2023-12-13

## 2023-12-13 ENCOUNTER — OFFICE (OUTPATIENT)
Dept: URBAN - METROPOLITAN AREA CLINIC 30 | Facility: CLINIC | Age: 78
Setting detail: OPHTHALMOLOGY
End: 2023-12-13
Payer: MEDICARE

## 2023-12-13 DIAGNOSIS — H35.373: ICD-10-CM

## 2023-12-13 DIAGNOSIS — H52.213: ICD-10-CM

## 2023-12-13 DIAGNOSIS — H01.004: ICD-10-CM

## 2023-12-13 DIAGNOSIS — H16.222: ICD-10-CM

## 2023-12-13 DIAGNOSIS — H35.352: ICD-10-CM

## 2023-12-13 DIAGNOSIS — Z96.1: ICD-10-CM

## 2023-12-13 DIAGNOSIS — H40.1131: ICD-10-CM

## 2023-12-13 DIAGNOSIS — H01.001: ICD-10-CM

## 2023-12-13 PROCEDURE — 92134 CPTRZ OPH DX IMG PST SGM RTA: CPT | Performed by: OPHTHALMOLOGY

## 2023-12-13 PROCEDURE — 99213 OFFICE O/P EST LOW 20 MIN: CPT | Performed by: OPHTHALMOLOGY

## 2023-12-13 ASSESSMENT — REFRACTION_CURRENTRX
OD_ADD: +2.75
OS_ADD: +2.50
OS_SPHERE: +1.75
OS_CYLINDER: +0.25
OS_CYLINDER: +0.75
OD_OVR_VA: 20/
OD_ADD: +2.50
OD_AXIS: 177
OS_ADD: +2.75
OD_SPHERE: -1.00
OS_SPHERE: PLANO
OD_CYLINDER: +2.00
OD_AXIS: 011
OS_OVR_VA: 20/
OD_CYLINDER: +1.50
OS_AXIS: 178
OD_OVR_VA: 20/
OD_SPHERE: -0.75
OS_AXIS: 024
OS_OVR_VA: 20/

## 2023-12-13 ASSESSMENT — CONFRONTATIONAL VISUAL FIELD TEST (CVF)
OS_FINDINGS: FULL
OD_FINDINGS: FULL

## 2023-12-13 ASSESSMENT — SPHEQUIV_DERIVED
OS_SPHEQUIV: 0
OD_SPHEQUIV: -0.25
OS_SPHEQUIV: 1.75
OD_SPHEQUIV: 0.125
OD_SPHEQUIV: -0.125
OS_SPHEQUIV: 0.125

## 2023-12-13 ASSESSMENT — CORNEAL DYSTROPHY - POSTERIOR
OS_POSTERIORDYSTROPHY: T GUTTATA
OD_POSTERIORDYSTROPHY: T GUTTATA

## 2023-12-13 ASSESSMENT — REFRACTION_MANIFEST
OD_SPHERE: -0.75
OU_VA: 20/20
OS_CYLINDER: +0.50
OS_SPHERE: +1.00
OS_CYLINDER: +1.50
OD_VA1: 20/25
OD_VA1: 20/25+
OS_AXIS: 180
OD_CYLINDER: +1.50
OS_SPHERE: -0.25
OD_CYLINDER: +1.25
OD_AXIS: 180
OD_AXIS: 180
OS_VA1: 20/30-2
OS_ADD: +2.50
OS_AXIS: 180
OS_VA1: 20/30
OD_ADD: +2.50
OD_SPHERE: -1.00

## 2023-12-13 ASSESSMENT — LID EXAM ASSESSMENTS
OS_BLEPHARITIS: LUL 1+
OD_BLEPHARITIS: RUL 1+

## 2023-12-13 ASSESSMENT — REFRACTION_AUTOREFRACTION
OD_SPHERE: -0.75
OD_AXIS: 180
OS_SPHERE: -0.25
OD_CYLINDER: +1.75
OS_AXIS: 010
OS_CYLINDER: +0.75

## 2023-12-13 ASSESSMENT — SUPERFICIAL PUNCTATE KERATITIS (SPK): OS_SPK: T

## 2024-01-12 ENCOUNTER — APPOINTMENT (OUTPATIENT)
Dept: FAMILY MEDICINE | Facility: CLINIC | Age: 79
End: 2024-01-12
Payer: MEDICARE

## 2024-01-12 VITALS
TEMPERATURE: 97.8 F | OXYGEN SATURATION: 96 % | RESPIRATION RATE: 16 BRPM | SYSTOLIC BLOOD PRESSURE: 160 MMHG | DIASTOLIC BLOOD PRESSURE: 90 MMHG | BODY MASS INDEX: 22.88 KG/M2 | HEART RATE: 80 BPM | WEIGHT: 134 LBS | HEIGHT: 64 IN

## 2024-01-12 DIAGNOSIS — R53.83 OTHER FATIGUE: ICD-10-CM

## 2024-01-12 DIAGNOSIS — R73.03 PREDIABETES.: ICD-10-CM

## 2024-01-12 DIAGNOSIS — E78.49 OTHER HYPERLIPIDEMIA: ICD-10-CM

## 2024-01-12 DIAGNOSIS — G44.219 EPISODIC TENSION-TYPE HEADACHE, NOT INTRACTABLE: ICD-10-CM

## 2024-01-12 DIAGNOSIS — R03.0 ELEVATED BLOOD-PRESSURE READING, W/OUT DIAGNOSIS OF HYPERTENSION: ICD-10-CM

## 2024-01-12 DIAGNOSIS — E55.9 VITAMIN D DEFICIENCY, UNSPECIFIED: ICD-10-CM

## 2024-01-12 PROCEDURE — 99214 OFFICE O/P EST MOD 30 MIN: CPT | Mod: 25

## 2024-01-12 PROCEDURE — 36415 COLL VENOUS BLD VENIPUNCTURE: CPT

## 2024-01-14 PROBLEM — E78.49 OTHER HYPERLIPIDEMIA: Status: ACTIVE | Noted: 2019-03-04

## 2024-01-14 PROBLEM — E55.9 VITAMIN D DEFICIENCY: Status: ACTIVE | Noted: 2024-01-12

## 2024-01-14 PROBLEM — R53.83 FATIGUE, UNSPECIFIED TYPE: Status: ACTIVE | Noted: 2019-03-07

## 2024-01-14 PROBLEM — R03.0 ELEVATED BLOOD PRESSURE READING WITHOUT DIAGNOSIS OF HYPERTENSION: Status: ACTIVE | Noted: 2024-01-12

## 2024-01-14 PROBLEM — G44.219 EPISODIC TENSION-TYPE HEADACHE, NOT INTRACTABLE: Status: ACTIVE | Noted: 2024-01-12

## 2024-01-14 PROBLEM — R73.03 PRE-DIABETES: Status: ACTIVE | Noted: 2021-03-05

## 2024-01-14 LAB
25(OH)D3 SERPL-MCNC: 71.8 NG/ML
ALBUMIN SERPL ELPH-MCNC: 4.2 G/DL
ALP BLD-CCNC: 81 U/L
ALT SERPL-CCNC: 16 U/L
ANION GAP SERPL CALC-SCNC: 15 MMOL/L
AST SERPL-CCNC: 23 U/L
BASOPHILS # BLD AUTO: 0.09 K/UL
BASOPHILS NFR BLD AUTO: 1.4 %
BILIRUB SERPL-MCNC: 0.2 MG/DL
BUN SERPL-MCNC: 18 MG/DL
CALCIUM SERPL-MCNC: 9.5 MG/DL
CHLORIDE SERPL-SCNC: 106 MMOL/L
CHOLEST SERPL-MCNC: 191 MG/DL
CO2 SERPL-SCNC: 20 MMOL/L
CREAT SERPL-MCNC: 0.83 MG/DL
EGFR: 71 ML/MIN/1.73M2
EOSINOPHIL # BLD AUTO: 0.18 K/UL
EOSINOPHIL NFR BLD AUTO: 2.7 %
ESTIMATED AVERAGE GLUCOSE: 126 MG/DL
FOLATE SERPL-MCNC: 15.4 NG/ML
GLUCOSE SERPL-MCNC: 74 MG/DL
HBA1C MFR BLD HPLC: 6 %
HCT VFR BLD CALC: 46.1 %
HDLC SERPL-MCNC: 56 MG/DL
HGB BLD-MCNC: 13.9 G/DL
IMM GRANULOCYTES NFR BLD AUTO: 0.3 %
LDLC SERPL CALC-MCNC: 100 MG/DL
LYMPHOCYTES # BLD AUTO: 1.89 K/UL
LYMPHOCYTES NFR BLD AUTO: 28.9 %
MAGNESIUM SERPL-MCNC: 2.1 MG/DL
MAN DIFF?: NORMAL
MCHC RBC-ENTMCNC: 29.1 PG
MCHC RBC-ENTMCNC: 30.2 GM/DL
MCV RBC AUTO: 96.6 FL
MONOCYTES # BLD AUTO: 0.63 K/UL
MONOCYTES NFR BLD AUTO: 9.6 %
NEUTROPHILS # BLD AUTO: 3.74 K/UL
NEUTROPHILS NFR BLD AUTO: 57.1 %
NONHDLC SERPL-MCNC: 135 MG/DL
PLATELET # BLD AUTO: 178 K/UL
POTASSIUM SERPL-SCNC: 5 MMOL/L
PROT SERPL-MCNC: 6.8 G/DL
RBC # BLD: 4.77 M/UL
RBC # FLD: 14.7 %
SODIUM SERPL-SCNC: 141 MMOL/L
TRIGL SERPL-MCNC: 200 MG/DL
TSH SERPL-ACNC: 3.76 UIU/ML
URATE SERPL-MCNC: 6.7 MG/DL
VIT B12 SERPL-MCNC: 330 PG/ML
WBC # FLD AUTO: 6.55 K/UL

## 2024-01-14 NOTE — PHYSICAL EXAM
[No Acute Distress] : no acute distress [Well Nourished] : well nourished [Well Developed] : well developed [Well-Appearing] : well-appearing [Normal Voice/Communication] : normal voice/communication [Normal Sclera/Conjunctiva] : normal sclera/conjunctiva [PERRL] : pupils equal round and reactive to light [EOMI] : extraocular movements intact [Fundoscopic Exam Performed] : fundoscopic ~T exam ~C was performed [Normal Outer Ear/Nose] : the outer ears and nose were normal in appearance [No JVD] : no jugular venous distention [Supple] : supple [No Respiratory Distress] : no respiratory distress  [Clear to Auscultation] : lungs were clear to auscultation bilaterally [Normal Rate] : normal rate  [Regular Rhythm] : with a regular rhythm [Normal S1, S2] : normal S1 and S2 [No Murmur] : no murmur heard [No Carotid Bruits] : no carotid bruits [No Edema] : there was no peripheral edema [Pedal Pulses Present] : the pedal pulses are present [Soft] : abdomen soft [Non Tender] : non-tender [No HSM] : no HSM [Normal Axillary Nodes] : no axillary lymphadenopathy [Normal Posterior Cervical Nodes] : no posterior cervical lymphadenopathy [Normal Anterior Cervical Nodes] : no anterior cervical lymphadenopathy [Normal Inguinal Nodes] : no inguinal lymphadenopathy [No CVA Tenderness] : no CVA  tenderness [No Joint Swelling] : no joint swelling [No Rash] : no rash [Coordination Grossly Intact] : coordination grossly intact [No Focal Deficits] : no focal deficits [Normal Gait] : normal gait [Alert and Oriented x3] : oriented to person, place, and time [de-identified] : (-)bilateral temporal tenderness no palpable cords

## 2024-01-14 NOTE — REVIEW OF SYSTEMS
[Headache] : headache [Negative] : Heme/Lymph [Fever] : no fever [Chills] : no chills [Fatigue] : no fatigue [Recent Change In Weight] : ~T no recent weight change [Vision Problems] : no vision problems [Chest Pain] : no chest pain [Palpitations] : no palpitations [Leg Claudication] : no leg claudication [Lower Ext Edema] : no lower extremity edema [Orthopnea] : no orthopnea [Paroxysmal Nocturnal Dyspnea] : no paroxysmal nocturnal dyspnea [Shortness Of Breath] : no shortness of breath [Wheezing] : no wheezing [Cough] : no cough [Dyspnea on Exertion] : no dyspnea on exertion [Abdominal Pain] : no abdominal pain [Hematuria] : no hematuria [Skin Rash] : no skin rash [Dizziness] : no dizziness [Fainting] : no fainting [Confusion] : no confusion [Memory Loss] : no memory loss [Unsteady Walking] : no ataxia [Insomnia] : no insomnia [Anxiety] : no anxiety [Depression] : no depression [Swollen Glands] : no swollen glands

## 2024-01-14 NOTE — HISTORY OF PRESENT ILLNESS
[FreeTextEntry8] : 78-year-old  female presents with history of frontal-occipital headaches over the last 2-3 months. Reports occurrences usually twice weekly and responds well to Tylenol. Denies any vascular symptoms-throbbing, unilateral, photophobia, phonophobia, nausea or vomiting. Denies aura. No worsening of headache with Valsalva maneuver. No prior history of migraine headaches. No recent history of head trauma. Also, needs screening laboratory testing.

## 2024-01-14 NOTE — HEALTH RISK ASSESSMENT
[No falls in past year] : Patient reported no falls in the past year [0] : 2) Feeling down, depressed, or hopeless: Not at all (0) [PHQ-2 Negative - No further assessment needed] : PHQ-2 Negative - No further assessment needed [Never] : Never [[id number]] : 0

## 2024-01-14 NOTE — PLAN
[FreeTextEntry1] : 1)-Treatment plan as outlined above. 2)-No prescription refills needed at this time. 3)-Laboratory specimens drawn in the clinic.

## 2024-02-07 ENCOUNTER — OFFICE (OUTPATIENT)
Dept: URBAN - METROPOLITAN AREA CLINIC 30 | Facility: CLINIC | Age: 79
Setting detail: OPHTHALMOLOGY
End: 2024-02-07
Payer: MEDICARE

## 2024-02-07 DIAGNOSIS — Z96.1: ICD-10-CM

## 2024-02-07 DIAGNOSIS — H16.222: ICD-10-CM

## 2024-02-07 DIAGNOSIS — H35.373: ICD-10-CM

## 2024-02-07 DIAGNOSIS — H52.213: ICD-10-CM

## 2024-02-07 DIAGNOSIS — H01.004: ICD-10-CM

## 2024-02-07 DIAGNOSIS — H40.1131: ICD-10-CM

## 2024-02-07 DIAGNOSIS — H01.001: ICD-10-CM

## 2024-02-07 DIAGNOSIS — H35.352: ICD-10-CM

## 2024-02-07 PROCEDURE — 99213 OFFICE O/P EST LOW 20 MIN: CPT | Performed by: OPHTHALMOLOGY

## 2024-02-07 PROCEDURE — 92134 CPTRZ OPH DX IMG PST SGM RTA: CPT | Performed by: OPHTHALMOLOGY

## 2024-02-07 ASSESSMENT — REFRACTION_CURRENTRX
OS_CYLINDER: +0.25
OS_OVR_VA: 20/
OS_AXIS: 024
OS_CYLINDER: +0.75
OD_CYLINDER: +1.50
OD_AXIS: 177
OD_AXIS: 011
OS_SPHERE: +1.75
OD_CYLINDER: +2.00
OD_ADD: +2.75
OD_SPHERE: -1.00
OS_SPHERE: PLANO
OS_ADD: +2.75
OD_SPHERE: -0.75
OD_ADD: +2.50
OD_OVR_VA: 20/
OS_OVR_VA: 20/
OS_ADD: +2.50
OD_OVR_VA: 20/
OS_AXIS: 178

## 2024-02-07 ASSESSMENT — SUPERFICIAL PUNCTATE KERATITIS (SPK): OS_SPK: T

## 2024-02-07 ASSESSMENT — REFRACTION_MANIFEST
OU_VA: 20/20
OD_AXIS: 180
OS_ADD: +2.75
OS_CYLINDER: +1.25
OS_SPHERE: -0.25
OS_AXIS: 175
OS_CYLINDER: +1.50
OD_AXIS: 180
OS_CYLINDER: +0.50
OS_ADD: +2.50
OD_SPHERE: -1.00
OD_SPHERE: -0.75
OS_AXIS: 180
OD_VA1: 20/25
OD_VA1: 20/25+
OD_SPHERE: -1.00
OD_CYLINDER: +1.50
OD_CYLINDER: +1.25
OS_VA1: 20/25-2
OS_VA1: 20/30
OD_ADD: +2.50
OS_VA1: 20/30-2
OS_SPHERE: +1.00
OD_CYLINDER: +1.50
OS_SPHERE: -0.75
OD_ADD: +2.75
OD_AXIS: 180
OS_AXIS: 180

## 2024-02-07 ASSESSMENT — SPHEQUIV_DERIVED
OD_SPHEQUIV: -0.25
OD_SPHEQUIV: 0.125
OS_SPHEQUIV: 1.75
OS_SPHEQUIV: 0.125
OS_SPHEQUIV: -0.125
OD_SPHEQUIV: -0.125
OD_SPHEQUIV: -0.25
OS_SPHEQUIV: 0

## 2024-02-07 ASSESSMENT — REFRACTION_AUTOREFRACTION
OS_AXIS: 010
OS_SPHERE: -0.25
OD_SPHERE: -0.75
OS_CYLINDER: +0.75
OD_CYLINDER: +1.75
OD_AXIS: 180

## 2024-02-07 ASSESSMENT — CORNEAL DYSTROPHY - POSTERIOR
OD_POSTERIORDYSTROPHY: T GUTTATA
OS_POSTERIORDYSTROPHY: T GUTTATA

## 2024-02-07 ASSESSMENT — CONFRONTATIONAL VISUAL FIELD TEST (CVF)
OS_FINDINGS: FULL
OD_FINDINGS: FULL

## 2024-02-07 ASSESSMENT — LID EXAM ASSESSMENTS
OS_BLEPHARITIS: LUL 1+
OD_BLEPHARITIS: RUL 1+

## 2024-03-28 ENCOUNTER — OFFICE (OUTPATIENT)
Dept: URBAN - METROPOLITAN AREA CLINIC 121 | Facility: CLINIC | Age: 79
Setting detail: OPHTHALMOLOGY
End: 2024-03-28
Payer: MEDICARE

## 2024-03-28 ENCOUNTER — RX ONLY (RX ONLY)
Age: 79
End: 2024-03-28

## 2024-03-28 DIAGNOSIS — H01.004: ICD-10-CM

## 2024-03-28 DIAGNOSIS — H40.1131: ICD-10-CM

## 2024-03-28 DIAGNOSIS — H35.352: ICD-10-CM

## 2024-03-28 DIAGNOSIS — H01.001: ICD-10-CM

## 2024-03-28 DIAGNOSIS — Z96.1: ICD-10-CM

## 2024-03-28 DIAGNOSIS — H52.213: ICD-10-CM

## 2024-03-28 DIAGNOSIS — H35.373: ICD-10-CM

## 2024-03-28 DIAGNOSIS — H16.223: ICD-10-CM

## 2024-03-28 PROCEDURE — 92014 COMPRE OPH EXAM EST PT 1/>: CPT | Performed by: OPHTHALMOLOGY

## 2024-03-28 PROCEDURE — 92134 CPTRZ OPH DX IMG PST SGM RTA: CPT | Performed by: OPHTHALMOLOGY

## 2024-03-28 ASSESSMENT — REFRACTION_MANIFEST
OS_SPHERE: -0.75
OS_AXIS: 180
OD_CYLINDER: +1.25
OS_ADD: +2.50
OS_AXIS: 180
OD_AXIS: 180
OS_SPHERE: -0.25
OS_CYLINDER: +1.25
OS_VA1: 20/30
OS_VA1: 20/30-2
OD_CYLINDER: +1.50
OU_VA: 20/20
OD_VA1: 20/25
OS_AXIS: 175
OS_CYLINDER: +0.50
OD_SPHERE: -0.75
OS_CYLINDER: +1.50
OD_AXIS: 180
OS_VA1: 20/25-2
OD_CYLINDER: +1.50
OS_SPHERE: +1.00
OD_VA1: 20/25+
OD_ADD: +2.50
OD_ADD: +2.75
OD_AXIS: 180
OD_SPHERE: -1.00
OD_SPHERE: -1.00
OS_ADD: +2.75

## 2024-03-28 ASSESSMENT — REFRACTION_CURRENTRX
OD_AXIS: 177
OD_SPHERE: -1.00
OD_CYLINDER: +1.50
OS_SPHERE: PLANO
OS_AXIS: 178
OD_OVR_VA: 20/
OD_ADD: +2.50
OD_OVR_VA: 20/
OS_AXIS: 024
OD_CYLINDER: +2.00
OS_CYLINDER: +0.25
OD_AXIS: 011
OS_ADD: +2.75
OS_SPHERE: +1.75
OD_SPHERE: -0.75
OD_ADD: +2.75
OS_OVR_VA: 20/
OS_CYLINDER: +0.75
OS_OVR_VA: 20/
OS_ADD: +2.50

## 2024-03-28 ASSESSMENT — SPHEQUIV_DERIVED
OS_SPHEQUIV: 1.75
OS_SPHEQUIV: 0
OD_SPHEQUIV: -0.25
OD_SPHEQUIV: -0.125
OS_SPHEQUIV: -0.125
OD_SPHEQUIV: -0.25

## 2024-03-28 ASSESSMENT — LID EXAM ASSESSMENTS
OS_BLEPHARITIS: LUL 1+
OD_BLEPHARITIS: RUL 1+

## 2024-04-24 ENCOUNTER — OFFICE (OUTPATIENT)
Dept: URBAN - METROPOLITAN AREA CLINIC 30 | Facility: CLINIC | Age: 79
Setting detail: OPHTHALMOLOGY
End: 2024-04-24
Payer: MEDICARE

## 2024-04-24 DIAGNOSIS — H16.223: ICD-10-CM

## 2024-04-24 DIAGNOSIS — H01.001: ICD-10-CM

## 2024-04-24 DIAGNOSIS — H40.1131: ICD-10-CM

## 2024-04-24 DIAGNOSIS — H35.352: ICD-10-CM

## 2024-04-24 PROCEDURE — 92083 EXTENDED VISUAL FIELD XM: CPT | Performed by: OPHTHALMOLOGY

## 2024-04-24 PROCEDURE — 92133 CPTRZD OPH DX IMG PST SGM ON: CPT | Performed by: OPHTHALMOLOGY

## 2024-04-24 PROCEDURE — 99213 OFFICE O/P EST LOW 20 MIN: CPT | Performed by: OPHTHALMOLOGY

## 2024-04-24 ASSESSMENT — LID EXAM ASSESSMENTS
OD_BLEPHARITIS: RUL 1+
OS_BLEPHARITIS: LUL 1+

## 2024-10-09 ENCOUNTER — OFFICE (OUTPATIENT)
Facility: LOCATION | Age: 79
Setting detail: OPHTHALMOLOGY
End: 2024-10-09
Payer: MEDICARE

## 2024-10-09 DIAGNOSIS — H35.373: ICD-10-CM

## 2024-10-09 DIAGNOSIS — Z96.1: ICD-10-CM

## 2024-10-09 DIAGNOSIS — H01.001: ICD-10-CM

## 2024-10-09 DIAGNOSIS — H01.004: ICD-10-CM

## 2024-10-09 DIAGNOSIS — H16.223: ICD-10-CM

## 2024-10-09 DIAGNOSIS — H40.1131: ICD-10-CM

## 2024-10-09 DIAGNOSIS — H52.213: ICD-10-CM

## 2024-10-09 PROCEDURE — 99214 OFFICE O/P EST MOD 30 MIN: CPT | Performed by: OPHTHALMOLOGY

## 2024-10-09 PROCEDURE — 92020 GONIOSCOPY: CPT | Performed by: OPHTHALMOLOGY

## 2024-10-09 PROCEDURE — 92134 CPTRZ OPH DX IMG PST SGM RTA: CPT | Performed by: OPHTHALMOLOGY

## 2024-10-09 ASSESSMENT — REFRACTION_CURRENTRX
OS_CYLINDER: +0.25
OD_AXIS: 175
OS_SPHERE: +1.75
OS_AXIS: 178
OS_ADD: +2.50
OD_SPHERE: -1.00
OS_CYLINDER: +0.75
OD_ADD: +3.00
OD_OVR_VA: 20/
OS_SPHERE: PLANO
OD_OVR_VA: 20/
OS_AXIS: 024
OS_CYLINDER: +1.00
OD_AXIS: 177
OS_AXIS: 175
OD_ADD: +2.75
OD_CYLINDER: +1.50
OS_OVR_VA: 20/
OS_OVR_VA: 20/
OD_SPHERE: -0.75
OS_SPHERE: -0.75
OS_ADD: +3.00
OS_OVR_VA: 20/
OD_OVR_VA: 20/
OD_CYLINDER: +1.25
OD_AXIS: 011
OS_ADD: +2.75
OD_SPHERE: -1.00
OD_CYLINDER: +2.00
OD_ADD: +2.50

## 2024-10-09 ASSESSMENT — TEAR BREAK UP TIME (TBUT)
OD_TBUT: 1+
OS_TBUT: 1+

## 2024-10-09 ASSESSMENT — REFRACTION_MANIFEST
OS_ADD: +2.75
OD_VA1: 20/25+
OS_AXIS: 180
OD_AXIS: 180
OD_ADD: +2.50
OD_CYLINDER: +1.25
OD_AXIS: 180
OS_SPHERE: -0.25
OS_SPHERE: +1.00
OS_CYLINDER: +0.50
OU_VA: 20/20
OD_VA1: 20/25
OS_ADD: +2.50
OS_CYLINDER: +1.50
OD_CYLINDER: +1.50
OD_SPHERE: -1.00
OS_VA1: 20/25-2
OS_CYLINDER: +1.25
OD_SPHERE: -0.75
OD_AXIS: 180
OD_ADD: +2.75
OS_VA1: 20/30-2
OS_AXIS: 180
OS_AXIS: 175
OD_CYLINDER: +1.50
OS_VA1: 20/30
OD_SPHERE: -1.00
OS_SPHERE: -0.75

## 2024-10-09 ASSESSMENT — PACHYMETRY
OD_CT_UM: 557
OS_CT_CORRECTION: -1
OS_CT_UM: 554
OD_CT_CORRECTION: -1

## 2024-10-09 ASSESSMENT — CORNEAL DYSTROPHY - POSTERIOR
OS_POSTERIORDYSTROPHY: T GUTTATA
OD_POSTERIORDYSTROPHY: T GUTTATA

## 2024-10-09 ASSESSMENT — LID EXAM ASSESSMENTS
OS_BLEPHARITIS: LUL 1+
OD_BLEPHARITIS: RUL 1+

## 2024-10-09 ASSESSMENT — CONFRONTATIONAL VISUAL FIELD TEST (CVF)
OD_FINDINGS: FULL
OS_FINDINGS: FULL

## 2024-10-09 ASSESSMENT — REFRACTION_AUTOREFRACTION
OD_CYLINDER: +1.75
OS_CYLINDER: +0.75
OD_SPHERE: -0.75
OS_SPHERE: -0.25
OD_AXIS: 180
OS_AXIS: 010

## 2024-10-09 ASSESSMENT — TONOMETRY
OD_IOP_MMHG: 19
OS_IOP_MMHG: 17

## 2024-10-09 ASSESSMENT — VISUAL ACUITY
OD_BCVA: 20/40
OS_BCVA: 20/25-1

## 2025-02-12 ENCOUNTER — OFFICE (OUTPATIENT)
Facility: LOCATION | Age: 80
Setting detail: OPHTHALMOLOGY
End: 2025-02-12
Payer: MEDICARE

## 2025-02-12 DIAGNOSIS — H01.004: ICD-10-CM

## 2025-02-12 DIAGNOSIS — H35.373: ICD-10-CM

## 2025-02-12 DIAGNOSIS — H01.001: ICD-10-CM

## 2025-02-12 DIAGNOSIS — H40.1131: ICD-10-CM

## 2025-02-12 PROCEDURE — 92134 CPTRZ OPH DX IMG PST SGM RTA: CPT | Performed by: OPHTHALMOLOGY

## 2025-02-12 PROCEDURE — 99213 OFFICE O/P EST LOW 20 MIN: CPT | Performed by: OPHTHALMOLOGY

## 2025-02-12 PROCEDURE — 92083 EXTENDED VISUAL FIELD XM: CPT | Performed by: OPHTHALMOLOGY

## 2025-02-12 ASSESSMENT — REFRACTION_MANIFEST
OS_CYLINDER: +1.50
OS_VA1: 20/30-2
OD_VA1: 20/25
OS_SPHERE: +1.00
OD_CYLINDER: +1.50
OS_AXIS: 180
OS_SPHERE: -0.75
OD_ADD: +2.50
OD_VA1: 20/25+
OD_CYLINDER: +1.50
OS_ADD: +2.50
OD_AXIS: 180
OD_CYLINDER: +1.25
OS_AXIS: 175
OU_VA: 20/20
OD_AXIS: 180
OS_SPHERE: -0.25
OD_SPHERE: -1.00
OD_ADD: +2.75
OS_ADD: +2.75
OS_CYLINDER: +1.25
OS_VA1: 20/25-2
OS_AXIS: 180
OD_AXIS: 180
OS_VA1: 20/30
OD_SPHERE: -0.75
OD_SPHERE: -1.00
OS_CYLINDER: +0.50

## 2025-02-12 ASSESSMENT — REFRACTION_CURRENTRX
OD_ADD: +2.50
OD_AXIS: 011
OD_ADD: +3.00
OS_AXIS: 024
OD_AXIS: 175
OS_CYLINDER: +1.00
OD_AXIS: 177
OS_CYLINDER: +0.25
OS_OVR_VA: 20/
OD_SPHERE: -1.00
OS_ADD: +2.75
OD_CYLINDER: +2.00
OD_CYLINDER: +1.50
OS_AXIS: 175
OD_OVR_VA: 20/
OS_SPHERE: +1.75
OS_OVR_VA: 20/
OD_OVR_VA: 20/
OS_OVR_VA: 20/
OS_ADD: +3.00
OD_OVR_VA: 20/
OD_SPHERE: -1.00
OD_CYLINDER: +1.25
OS_ADD: +2.50
OS_SPHERE: -0.75
OS_CYLINDER: +0.75
OD_SPHERE: -0.75
OD_ADD: +2.75
OS_SPHERE: -0.75
OS_AXIS: 175

## 2025-02-12 ASSESSMENT — VISUAL ACUITY
OS_BCVA: 20/20-1
OD_BCVA: 20/30

## 2025-02-12 ASSESSMENT — LID EXAM ASSESSMENTS
OS_BLEPHARITIS: LUL 1+
OD_BLEPHARITIS: RUL 1+

## 2025-02-12 ASSESSMENT — REFRACTION_AUTOREFRACTION
OS_SPHERE: -0.25
OS_CYLINDER: +0.75
OS_AXIS: 010
OD_AXIS: 180
OD_CYLINDER: +1.75
OD_SPHERE: -0.75

## 2025-02-12 ASSESSMENT — CORNEAL DYSTROPHY - POSTERIOR
OD_POSTERIORDYSTROPHY: T GUTTATA
OS_POSTERIORDYSTROPHY: T GUTTATA

## 2025-02-12 ASSESSMENT — TONOMETRY
OD_IOP_MMHG: 17
OS_IOP_MMHG: 14

## 2025-02-12 ASSESSMENT — PACHYMETRY
OS_CT_UM: 554
OD_CT_CORRECTION: -1
OD_CT_UM: 557
OS_CT_CORRECTION: -1

## 2025-02-12 ASSESSMENT — CONFRONTATIONAL VISUAL FIELD TEST (CVF)
OS_FINDINGS: FULL
OD_FINDINGS: FULL

## 2025-02-12 ASSESSMENT — TEAR BREAK UP TIME (TBUT)
OS_TBUT: 1+
OD_TBUT: 1+

## 2025-03-24 ENCOUNTER — APPOINTMENT (OUTPATIENT)
Dept: FAMILY MEDICINE | Facility: CLINIC | Age: 80
End: 2025-03-24
Payer: MEDICARE

## 2025-03-24 VITALS
OXYGEN SATURATION: 97 % | TEMPERATURE: 98 F | HEIGHT: 64 IN | BODY MASS INDEX: 23.22 KG/M2 | RESPIRATION RATE: 16 BRPM | HEART RATE: 90 BPM | SYSTOLIC BLOOD PRESSURE: 140 MMHG | WEIGHT: 136 LBS | DIASTOLIC BLOOD PRESSURE: 70 MMHG

## 2025-03-24 DIAGNOSIS — F32.9 MAJOR DEPRESSIVE DISORDER, SINGLE EPISODE, UNSPECIFIED: ICD-10-CM

## 2025-03-24 DIAGNOSIS — E78.49 OTHER HYPERLIPIDEMIA: ICD-10-CM

## 2025-03-24 DIAGNOSIS — R73.03 PREDIABETES.: ICD-10-CM

## 2025-03-24 DIAGNOSIS — R53.83 OTHER FATIGUE: ICD-10-CM

## 2025-03-24 DIAGNOSIS — E55.9 VITAMIN D DEFICIENCY, UNSPECIFIED: ICD-10-CM

## 2025-03-24 PROCEDURE — 36415 COLL VENOUS BLD VENIPUNCTURE: CPT

## 2025-03-24 PROCEDURE — G2211 COMPLEX E/M VISIT ADD ON: CPT

## 2025-03-24 PROCEDURE — 99214 OFFICE O/P EST MOD 30 MIN: CPT

## 2025-03-24 RX ORDER — SERTRALINE HYDROCHLORIDE 50 MG/1
50 TABLET, FILM COATED ORAL
Qty: 45 | Refills: 0 | Status: ACTIVE | COMMUNITY
Start: 2025-03-24 | End: 1900-01-01

## 2025-03-25 LAB
25(OH)D3 SERPL-MCNC: 76.2 NG/ML
ALBUMIN SERPL ELPH-MCNC: 4 G/DL
ALP BLD-CCNC: 80 U/L
ALT SERPL-CCNC: 15 U/L
ANION GAP SERPL CALC-SCNC: 15 MMOL/L
AST SERPL-CCNC: 19 U/L
BASOPHILS # BLD AUTO: 0.09 K/UL
BASOPHILS NFR BLD AUTO: 1.3 %
BILIRUB SERPL-MCNC: <0.2 MG/DL
BUN SERPL-MCNC: 22 MG/DL
CALCIUM SERPL-MCNC: 9 MG/DL
CHLORIDE SERPL-SCNC: 108 MMOL/L
CHOLEST SERPL-MCNC: 188 MG/DL
CO2 SERPL-SCNC: 21 MMOL/L
CREAT SERPL-MCNC: 0.77 MG/DL
EGFRCR SERPLBLD CKD-EPI 2021: 77 ML/MIN/1.73M2
EOSINOPHIL # BLD AUTO: 0.21 K/UL
EOSINOPHIL NFR BLD AUTO: 2.9 %
ESTIMATED AVERAGE GLUCOSE: 126 MG/DL
FOLATE SERPL-MCNC: 13.1 NG/ML
GLUCOSE SERPL-MCNC: 161 MG/DL
HBA1C MFR BLD HPLC: 6 %
HCT VFR BLD CALC: 39.1 %
HDLC SERPL-MCNC: 44 MG/DL
HGB BLD-MCNC: 13 G/DL
IMM GRANULOCYTES NFR BLD AUTO: 0.1 %
LDLC SERPL-MCNC: 76 MG/DL
LYMPHOCYTES # BLD AUTO: 2.22 K/UL
LYMPHOCYTES NFR BLD AUTO: 31 %
MAGNESIUM SERPL-MCNC: 2 MG/DL
MAN DIFF?: NORMAL
MCHC RBC-ENTMCNC: 30.5 PG
MCHC RBC-ENTMCNC: 33.2 G/DL
MCV RBC AUTO: 91.8 FL
MONOCYTES # BLD AUTO: 0.82 K/UL
MONOCYTES NFR BLD AUTO: 11.5 %
NEUTROPHILS # BLD AUTO: 3.8 K/UL
NEUTROPHILS NFR BLD AUTO: 53.2 %
NONHDLC SERPL-MCNC: 144 MG/DL
PLATELET # BLD AUTO: 159 K/UL
POTASSIUM SERPL-SCNC: 4.1 MMOL/L
PROT SERPL-MCNC: 6.8 G/DL
RBC # BLD: 4.26 M/UL
RBC # FLD: 14.2 %
SODIUM SERPL-SCNC: 144 MMOL/L
TRIGL SERPL-MCNC: 433 MG/DL
TSH SERPL-ACNC: 3.92 UIU/ML
URATE SERPL-MCNC: 7.2 MG/DL
VIT B12 SERPL-MCNC: 1374 PG/ML
WBC # FLD AUTO: 7.15 K/UL

## 2025-03-30 PROBLEM — F32.9 CURRENT EPISODE OF MAJOR DEPRESSIVE DISORDER WITHOUT PRIOR EPISODE, UNSPECIFIED DEPRESSION EPISODE SEVERITY: Status: ACTIVE | Noted: 2025-03-24

## 2025-05-21 ENCOUNTER — OFFICE (OUTPATIENT)
Facility: LOCATION | Age: 80
Setting detail: OPHTHALMOLOGY
End: 2025-05-21
Payer: MEDICARE

## 2025-05-21 DIAGNOSIS — Z96.1: ICD-10-CM

## 2025-05-21 DIAGNOSIS — H40.1131: ICD-10-CM

## 2025-05-21 DIAGNOSIS — H16.223: ICD-10-CM

## 2025-05-21 DIAGNOSIS — H52.213: ICD-10-CM

## 2025-05-21 DIAGNOSIS — H35.373: ICD-10-CM

## 2025-05-21 DIAGNOSIS — H01.001: ICD-10-CM

## 2025-05-21 DIAGNOSIS — H01.004: ICD-10-CM

## 2025-05-21 PROCEDURE — 99213 OFFICE O/P EST LOW 20 MIN: CPT | Performed by: OPHTHALMOLOGY

## 2025-05-21 PROCEDURE — 92133 CPTRZD OPH DX IMG PST SGM ON: CPT | Performed by: OPHTHALMOLOGY

## 2025-05-21 ASSESSMENT — REFRACTION_CURRENTRX
OD_SPHERE: -1.00
OD_AXIS: 175
OD_OVR_VA: 20/
OD_ADD: +2.50
OS_SPHERE: -0.75
OD_SPHERE: -0.75
OS_SPHERE: +1.75
OD_SPHERE: -1.00
OD_ADD: +3.00
OS_CYLINDER: +0.75
OS_AXIS: 175
OD_OVR_VA: 20/
OS_ADD: +2.50
OS_AXIS: 175
OD_CYLINDER: +1.25
OS_ADD: +3.00
OD_AXIS: 011
OD_CYLINDER: +1.50
OD_ADD: +2.75
OD_AXIS: 177
OS_SPHERE: -0.75
OS_OVR_VA: 20/
OS_AXIS: 024
OD_CYLINDER: +2.00
OS_CYLINDER: +0.25
OS_ADD: +2.75
OD_OVR_VA: 20/
OS_OVR_VA: 20/
OS_OVR_VA: 20/
OS_CYLINDER: +1.00

## 2025-05-21 ASSESSMENT — REFRACTION_MANIFEST
OS_CYLINDER: +1.25
OS_SPHERE: +1.00
OD_CYLINDER: +1.50
OS_AXIS: 180
OS_AXIS: 180
OD_AXIS: 180
OD_CYLINDER: +1.25
OD_AXIS: 180
OD_SPHERE: -1.00
OS_CYLINDER: +0.50
OS_VA1: 20/30
OS_VA1: 20/30-2
OD_CYLINDER: +1.50
OD_SPHERE: -0.75
OS_VA1: 20/25-2
OS_SPHERE: -0.25
OS_CYLINDER: +1.50
OD_AXIS: 180
OD_ADD: +2.50
OS_AXIS: 175
OD_VA1: 20/25+
OD_ADD: +2.75
OS_ADD: +2.75
OU_VA: 20/20
OD_SPHERE: -1.00
OD_VA1: 20/25
OS_SPHERE: -0.75
OS_ADD: +2.50

## 2025-05-21 ASSESSMENT — CONFRONTATIONAL VISUAL FIELD TEST (CVF)
OS_FINDINGS: FULL
OD_FINDINGS: FULL

## 2025-05-21 ASSESSMENT — REFRACTION_AUTOREFRACTION
OS_CYLINDER: +0.75
OD_SPHERE: -0.75
OS_SPHERE: -0.25
OS_AXIS: 010
OD_AXIS: 180
OD_CYLINDER: +1.75

## 2025-05-21 ASSESSMENT — VISUAL ACUITY
OS_BCVA: 20/30-1
OD_BCVA: 20/30

## 2025-05-21 ASSESSMENT — PACHYMETRY
OS_CT_UM: 554
OD_CT_UM: 557
OS_CT_CORRECTION: -1
OD_CT_CORRECTION: -1

## 2025-05-21 ASSESSMENT — LID EXAM ASSESSMENTS
OS_BLEPHARITIS: LUL 1+
OD_BLEPHARITIS: RUL 1+

## 2025-05-21 ASSESSMENT — TEAR BREAK UP TIME (TBUT)
OS_TBUT: 1+
OD_TBUT: 1+

## 2025-05-21 ASSESSMENT — TONOMETRY
OD_IOP_MMHG: 18
OS_IOP_MMHG: 16

## 2025-05-21 ASSESSMENT — CORNEAL DYSTROPHY - POSTERIOR
OS_POSTERIORDYSTROPHY: T GUTTATA
OD_POSTERIORDYSTROPHY: T GUTTATA

## 2025-05-29 ENCOUNTER — APPOINTMENT (OUTPATIENT)
Dept: FAMILY MEDICINE | Facility: CLINIC | Age: 80
End: 2025-05-29
Payer: MEDICARE

## 2025-05-29 VITALS
BODY MASS INDEX: 23.22 KG/M2 | HEIGHT: 64 IN | HEART RATE: 77 BPM | DIASTOLIC BLOOD PRESSURE: 66 MMHG | WEIGHT: 136 LBS | TEMPERATURE: 98.5 F | RESPIRATION RATE: 16 BRPM | OXYGEN SATURATION: 96 % | SYSTOLIC BLOOD PRESSURE: 142 MMHG

## 2025-05-29 DIAGNOSIS — F41.1 GENERALIZED ANXIETY DISORDER: ICD-10-CM

## 2025-05-29 DIAGNOSIS — Z23 ENCOUNTER FOR IMMUNIZATION: ICD-10-CM

## 2025-05-29 DIAGNOSIS — F32.9 MAJOR DEPRESSIVE DISORDER, SINGLE EPISODE, UNSPECIFIED: ICD-10-CM

## 2025-05-29 PROCEDURE — 99214 OFFICE O/P EST MOD 30 MIN: CPT | Mod: 25

## 2025-05-29 PROCEDURE — G0009: CPT

## 2025-05-29 PROCEDURE — 90732 PPSV23 VACC 2 YRS+ SUBQ/IM: CPT

## 2025-05-30 PROBLEM — F41.1 GAD (GENERALIZED ANXIETY DISORDER): Status: ACTIVE | Noted: 2025-04-27
